# Patient Record
Sex: MALE | Race: WHITE | NOT HISPANIC OR LATINO | Employment: OTHER | URBAN - NONMETROPOLITAN AREA
[De-identification: names, ages, dates, MRNs, and addresses within clinical notes are randomized per-mention and may not be internally consistent; named-entity substitution may affect disease eponyms.]

---

## 2017-01-06 RX ORDER — LOSARTAN POTASSIUM 25 MG/1
25 TABLET ORAL DAILY
Qty: 30 TABLET | Refills: 3 | Status: SHIPPED | OUTPATIENT
Start: 2017-01-06 | End: 2017-01-10 | Stop reason: DRUGHIGH

## 2017-01-10 ENCOUNTER — OFFICE VISIT (OUTPATIENT)
Dept: FAMILY MEDICINE CLINIC | Facility: CLINIC | Age: 47
End: 2017-01-10

## 2017-01-10 VITALS
OXYGEN SATURATION: 98 % | BODY MASS INDEX: 35.28 KG/M2 | WEIGHT: 252 LBS | HEIGHT: 71 IN | HEART RATE: 94 BPM | SYSTOLIC BLOOD PRESSURE: 140 MMHG | DIASTOLIC BLOOD PRESSURE: 90 MMHG

## 2017-01-10 DIAGNOSIS — G47.09 OTHER INSOMNIA: ICD-10-CM

## 2017-01-10 DIAGNOSIS — F17.200 SMOKES TOBACCO DAILY: ICD-10-CM

## 2017-01-10 DIAGNOSIS — I10 ESSENTIAL HYPERTENSION: ICD-10-CM

## 2017-01-10 DIAGNOSIS — E11.65 TYPE 2 DIABETES MELLITUS WITH HYPERGLYCEMIA, WITHOUT LONG-TERM CURRENT USE OF INSULIN (HCC): Primary | ICD-10-CM

## 2017-01-10 PROBLEM — G47.00 INSOMNIA: Status: ACTIVE | Noted: 2017-01-10

## 2017-01-10 LAB — HBA1C MFR BLD CALC: 8.6 %TOTHGB (ref 4–5.6)

## 2017-01-10 PROCEDURE — 99214 OFFICE O/P EST MOD 30 MIN: CPT | Performed by: FAMILY MEDICINE

## 2017-01-10 RX ORDER — LOSARTAN POTASSIUM 50 MG/1
50 TABLET ORAL DAILY
Qty: 30 TABLET | Refills: 5 | Status: SHIPPED | OUTPATIENT
Start: 2017-01-10 | End: 2018-09-06 | Stop reason: ALTCHOICE

## 2017-01-10 RX ORDER — TRAZODONE HYDROCHLORIDE 50 MG/1
50 TABLET ORAL NIGHTLY
Qty: 30 TABLET | Refills: 5 | Status: SHIPPED | OUTPATIENT
Start: 2017-01-10 | End: 2019-03-01

## 2017-01-10 RX ORDER — ASPIRIN 81 MG/1
81 TABLET, CHEWABLE ORAL DAILY
COMMUNITY

## 2017-01-10 NOTE — MR AVS SNAPSHOT
Jak Valle   1/10/2017 9:45 AM   Office Visit    Dept Phone:  [phone]   Encounter #:  [id number]    Provider:  Luisa Walker MD   Department:  [facility] FAMILY MEDICINE                Your Full Care Plan              Today's Medication Changes          These changes are accurate as of: 1/10/17 10:18 AM.  If you have any questions, ask your nurse or doctor.               New Medication(s)Ordered:     traZODone 50 MG tablet   Commonly known as:  DESYREL   Take 1 tablet by mouth Every Night.         Medication(s)that have changed:     losartan 50 MG tablet   Commonly known as:  COZAAR   Take 1 tablet by mouth Daily.   What changed:    - medication strength  - how much to take       metFORMIN 1000 MG tablet   Commonly known as:  GLUCOPHAGE   Take 1 tablet by mouth 2 (Two) Times a Day With Meals.   What changed:    - medication strength  - how much to take  - when to take this  - additional instructions         Stop taking medication(s)listed here:     FLUoxetine 10 MG capsule   Commonly known as:  PROzac                Where to Get Your Medications      These medications were sent to [facility] [place] [address] - [address] [place] [address] [phone] [facility] [phone] [address] [address]     Phone:  [phone]     losartan 50 MG tablet    metFORMIN 1000 MG tablet    traZODone 50 MG tablet                  Your Updated Medication List          This list is accurate as of: 1/10/17 10:18 AM.  Always use your most recent med list.                aspirin 81 MG chewable tablet       atorvastatin 10 MG tablet   Commonly known as:  LIPITOR       losartan 50 MG tablet   Commonly known as:  COZAAR   Take 1 tablet by mouth Daily.       metFORMIN 1000 MG tablet   Commonly known as:  GLUCOPHAGE   Take 1 tablet by mouth 2 (Two) Times a Day With Meals.       traZODone 50 MG tablet   Commonly known as:  DESYREL   Take 1 tablet by  "mouth Every Night.               We Performed the Following     Hemoglobin A1c       You Were Diagnosed With        Codes Comments    Type 2 diabetes mellitus with hyperglycemia, without long-term current use of insulin    -  Primary ICD-10-CM: E11.65  ICD-9-CM: 250.00, 790.29       Instructions     None    Patient Instructions History      Upcoming Appointments     Visit Type Date Time Department    OFFICE VISIT 1/10/2017  9:45 AM [facility]  [facility]    OFFICE VISIT 8/25/2017  3:00 PM [facility] OPHTHALMOLOGY [facility] Signup     Our records indicate that you have an active [employer] account.    You can view your After Visit Summary by going to [employer] and logging in with your [employer] username and password.  If you don't have a [employer] username and password but a parent or guardian has access to your record, the parent or guardian should login with their own [employer] username and password and access your record to view the After Visit Summary.    If you have questions, you can email [email or web address] or call [phone] to talk to our [employer] staff.  Remember, [employer] is NOT to be used for urgent needs.  For medical emergencies, dial 911.               Other Info from Your Visit           Your Appointments     Aug 25, 2017  3:00 PM CDT   Office Visit with Roscoe Cuevas MD   [facility] (--)    [address] [place] [address] Dr  [facility] [address] [place] [address] [phone]           Arrive 15 minutes prior to appointment.              Other Notes About Your Plan     Risk Score 4        Allergies     No Known Allergies      Reason for Visit     Diabetes     Arm Pain left    Hypertension           Vital Signs     Blood Pressure Pulse Height Weight Oxygen Saturation Body Mass Index    140/90 (BP Location: Right arm, Patient Position: Sitting, Cuff Size: Adult) 94 71\" (180.3 cm) 252 lb (114 kg) 98% 35.15 kg/m2    Smoking Status             "       Current Every Day Smoker           Problems and Diagnoses Noted     Diabetes with complication

## 2017-01-10 NOTE — PROGRESS NOTES
"  Subjective:     Jak Valle is a 46 y.o. male who presents for follow up of Type 2 diabetes mellitus.      The patient was initially diagnosed with Type 2 diabetes mellitus based on the following criteria:  Labs and symptoms.  Patient has been taking metformin 500 twice a day.  He has not been taking his Prozac nor his Lipitor.    Known diabetic complications: cardiovascular disease  Cardiovascular risk factors: diabetes mellitus, dyslipidemia, hypertension, male gender, microalbuminuria, obesity (BMI >= 30 kg/m2), sedentary lifestyle and smoking/ tobacco exposure  Current diabetic medications include oral agent (monotherapy): metformin (generic).     Eye exam current (within one year): yes   Weight trend: stable  Prior visit with dietician: yes - at first diagnosis  Current diet: in general, an \"unhealthy\" diet, diabetic  Current exercise: none    Current monitoring regimen: home blood tests - daily and office lab tests - daily  Home blood sugar records: fasting range: 140s-150s  Any episodes of hypoglycemia? no     ACE inhibitor or angiotensin II receptor blocker?     Yes     lisinopril (generic)     Statin?     Yes    ASA?     Yes     HEMOGLOBIN A1C (%TotHgb)   Date Value   01/10/2017 8.6 (H)   10/11/2016 7.6 (H)   07/14/2016 9.0 (H)       ASSOCIATED SYMPTOMS:     FatigueYes   MalaiseNo    DiaphoresisNo    Weight lossNo    Weight gainNo    Visual impairmentNo   Chest painNo    PalpitationsNo    TachycardiaNo   ClaudicationNo    PolyphagiaNo    PolydipsiaNo    PolyuriaNo    NumbnessNo    Foot painNo    Skin lesionsNo    Memory lossNo     COMORBID CONDITIONS:     ObesityYes    HypothyroidismNo    HyperlipidemiaYes    CADNo    Cerebrovascular diseaseNo   PVDNo    HTNYes    Sexual dysfunctionNo    DepressionYes   He is struggling with insomnia.  He goes to bed about 10 PM and wakes up multiple times throughout the night.  He does wake up at 5 AM.  He does not feel well rested.  He denies snoring symptoms or " nocturia.  He thinks it is due to problems with his son.  He was thinking about taking over-the-counter medication but was wanting to check with me first.     The following portions of the patient's history were reviewed and updated as appropriate: allergies, current medications, past family history, past medical history, past social history, past surgical history and problem list.    Preventative:  Over the past 2 weeks, have you felt down, depressed, or hopeless?No   Over the past 2 weeks, have you felt little interest or pleasure in doing things?No  Clinical depression screening refused by patient.No     On osteoporosis therapy?Not Indicated     Past Medical Hx:  Past Medical History   Diagnosis Date   • Amblyopia    • Diabetes mellitus type 2 in obese    • Dyslipidemia    • Exotropia    • GERD (gastroesophageal reflux disease)    • Hypertensive disorder    • Major depressive disorder      RECURRENT, UNSPECIFIED   • Major depressive disorder, recurrent    • Microalbuminuria    • Myopia      right eye   • Obesity    • Type 2 diabetes mellitus with hyperglycemia        Past Surgical Hx:  Past Surgical History   Procedure Laterality Date   • Umbilical hernia repair  04/01/2014     Open umbilical hernia repair. Excision of 2.5 cm sebaceous cyst of back with 5 cm intermediate closure.       Health Maintenance:  Health Maintenance   Topic Date Due   • HEMOGLOBIN A1C  04/11/2017   • DIABETIC FOOT EXAM  07/14/2017   • URINE MICROALBUMIN  07/14/2017   • DIABETIC EYE EXAM  08/19/2017   • TDAP/TD VACCINES (2 - Td) 10/11/2026   • PNEUMOCOCCAL VACCINE (19-64 MEDIUM RISK)  Addressed   • INFLUENZA VACCINE  Addressed       Current Meds:    Current Outpatient Prescriptions:   •  aspirin 81 MG chewable tablet, Chew 81 mg Daily., Disp: , Rfl:   •  atorvastatin (LIPITOR) 10 MG tablet, Take 10 mg by mouth daily., Disp: , Rfl:   •  losartan (COZAAR) 50 MG tablet, Take 1 tablet by mouth Daily., Disp: 30 tablet, Rfl: 5  •  metFORMIN  "(GLUCOPHAGE) 1000 MG tablet, Take 1 tablet by mouth 2 (Two) Times a Day With Meals., Disp: 60 tablet, Rfl: 5  •  traZODone (DESYREL) 50 MG tablet, Take 1 tablet by mouth Every Night., Disp: 30 tablet, Rfl: 5    Allergies:  Review of patient's allergies indicates no known allergies.    Family Hx:  Family History   Problem Relation Age of Onset   • Adopted: Yes   • Family history unknown: Yes        Social History:  Social History     Social History   • Marital status:      Spouse name: N/A   • Number of children: N/A   • Years of education: N/A     Occupational History   • Not on file.     Social History Main Topics   • Smoking status: Current Every Day Smoker   • Smokeless tobacco: Never Used      Comment: 1 pk a day x more than 10 yrs   • Alcohol use No   • Drug use: Not on file   • Sexual activity: Not on file     Other Topics Concern   • Not on file     Social History Narrative       Review of Systems  Review of Systems   Constitutional: Positive for fatigue. Negative for activity change, appetite change and fever.   HENT: Negative for ear pain and sore throat.    Eyes: Negative for pain and visual disturbance.   Respiratory: Negative for cough and shortness of breath.    Cardiovascular: Negative for chest pain and palpitations.   Gastrointestinal: Negative for abdominal pain and nausea.   Endocrine: Negative for cold intolerance and heat intolerance.   Genitourinary: Negative for difficulty urinating and dysuria.   Musculoskeletal: Negative for arthralgias and gait problem.   Skin: Negative for color change and rash.   Neurological: Negative for dizziness, weakness and headaches.   Hematological: Negative for adenopathy. Does not bruise/bleed easily.   Psychiatric/Behavioral: Positive for sleep disturbance. Negative for agitation and confusion.       Objective:     Visit Vitals   • /90 (BP Location: Right arm, Patient Position: Sitting, Cuff Size: Adult)   • Pulse 94   • Ht 71\" (180.3 cm)   • Wt " 252 lb (114 kg)   • SpO2 98%   • BMI 35.15 kg/m2       General:  alert, appears stated age, cooperative and no distress   Oropharynx: lips, mucosa, and tongue normal; teeth and gums normal    Eyes:  conjunctivae/corneas clear. PERRL, EOM's intact.    Ears:  normal TM's and external ear canals both ears   Neck: supple, symmetrical, trachea midline   Thyroid:  deferred   Lung: clear to auscultation bilaterally   Heart:  regular rate and rhythm, S1, S2 normal, no murmur, click, rub or gallop   Abdomen: soft, non-tender; bowel sounds normal; no masses,  no organomegaly   Extremities: extremities normal, atraumatic, no cyanosis or edema, left plantar surface below 5th digit plantar wart   Skin: warm and dry, no hyperpigmentation, vitiligo, or suspicious lesions right thumb two scabbed healing lesions    Pulses: 2+ and symmetric   Neuro: normal without focal findings, mental status, speech normal, alert and oriented x3     Diabetic foot exam:   Left: Filament test present   Pulses Dorsalis Pedis:  present      Vibratory sensation normal   Proprioception normal   Sharp/dull discrimination normal       Right: Filament test present   Pulses Dorsalis Pedis:  present   Vibratory sensation normal   Proprioception normal   Sharp/dull discrimination normal    Lab Review  GLUCOSE (mg/dl)   Date Value   10/11/2016 256 (H)   07/14/2016 264 (H)   09/22/2015 123 (H)     SODIUM (mmol/L)   Date Value   10/11/2016 141   07/14/2016 141   09/22/2015 139     POTASSIUM (mmol/L)   Date Value   10/11/2016 3.8   07/14/2016 4.2   09/22/2015 4.6     CHLORIDE (mmol/L)   Date Value   10/11/2016 103   07/14/2016 100   09/22/2015 100     CO2 (mmol/L)   Date Value   10/11/2016 27   07/14/2016 27   09/22/2015 29     BUN (mg/dl)   Date Value   10/11/2016 10   07/14/2016 14   09/22/2015 16     CREATININE (mg/dl)   Date Value   10/11/2016 0.8   07/14/2016 0.9   09/22/2015 0.9     HEMOGLOBIN A1C (%TotHgb)   Date Value   01/10/2017 8.6 (H)   10/11/2016 7.6  (H)   07/14/2016 9.0 (H)     TRIGLYCERIDES (mg/dl)   Date Value   10/11/2016 230 (H)   07/14/2016 330 (H)   09/22/2015 339 (H)            Assessment:     Diabetes Mellitus type II, under poor control.  1. Type 2 diabetes mellitus with hyperglycemia, without long-term current use of insulin    2. Other insomnia    3. Essential hypertension    4. Body mass index 35.0-35.9, adult    5. Smokes tobacco daily         Plan:     1.  Rx changes: Stop prozac. Increase metformin to 1000mg BID.  Strongly encouraged to take the lipitor.  Add trazodone for sleep.  Advised to avoid OTC sleep aids.   2.  Education: Reviewed ‘ABCs’ of diabetes management (respective goals in parentheses):  A1C (<7), preprandial glucose (70 mg/dl - 130 mg/dl), postprandial glucose (< 180 mg/dl), blood pressure (<130/80), and cholesterol (LDL <100 mg/dl; HDL > 50 mg/dl; Trig < 150 mg/dl).  3.  Issues reviewed with Jak Valle: low cholesterol diet, weight control and daily exercise discussed, all medications, side effects and compliance discussed carefully, foot care discussed and Podiatry visits discussed, annual eye examinations at Ophthalmology discussed, long term diabetic complications discussed and patient urged in the strongest terms to quit smoking.  4.  Compliance at present is estimated to be fair. Efforts to improve compliance (if necessary) will be directed at increased exercise and medication compliance.  5. Follow up: 3 months   6. Labwork before leaving today.      GOALS:  Weight loss 10 pounds  BARRIERS TO GOALS:  None    Preventative:  Recommended:Influenza and Pneumovax declined  Smoking cessation counseling was provided.  does not drink  eat more fruits and vegetables, decrease soda or juice intake, increase physical activity, reduce screen time, reduce portion size, cut out extra servings, plan meals, eat breakfast and have 3 meals a day    RISK SCORE: 4         This document has been electronically signed by Luisa Plata  MD [employer] on January 10, 2017 2:06 PM

## 2017-01-17 ENCOUNTER — OFFICE VISIT (OUTPATIENT)
Dept: FAMILY MEDICINE CLINIC | Facility: CLINIC | Age: 47
End: 2017-01-17

## 2017-01-17 VITALS
WEIGHT: 252 LBS | HEART RATE: 103 BPM | BODY MASS INDEX: 34.13 KG/M2 | SYSTOLIC BLOOD PRESSURE: 130 MMHG | DIASTOLIC BLOOD PRESSURE: 80 MMHG | OXYGEN SATURATION: 98 % | HEIGHT: 72 IN

## 2017-01-17 DIAGNOSIS — E11.65 TYPE 2 DIABETES MELLITUS WITH HYPERGLYCEMIA, WITHOUT LONG-TERM CURRENT USE OF INSULIN (HCC): Primary | ICD-10-CM

## 2017-01-17 DIAGNOSIS — F17.200 SMOKES TOBACCO DAILY: ICD-10-CM

## 2017-01-17 PROCEDURE — 99213 OFFICE O/P EST LOW 20 MIN: CPT | Performed by: FAMILY MEDICINE

## 2017-01-17 NOTE — PROGRESS NOTES
Subjective   Jak Valle is a 46 y.o. male. Pt reports earlier he was having a lack of energy and was getting some confused. He states that it felt like it use to feel when his blood pressure would go way up. He went home and checked his blood sugar and noticed it was 284 at about 1340 and 265 at about 1355 which was about 3-4 hours after he started feeling a little off. Pt reports that he normally takes metformin and was noted to have an increase in his a1c at recent labs from 1/10/17 with A1C of 8.6.    History of Present Illness     The following portions of the patient's history were reviewed and updated as appropriate: allergies, current medications, past family history, past medical history, past social history, past surgical history and problem list.    Review of Systems   Constitutional: Negative.  Negative for activity change, appetite change, fatigue and fever.   HENT: Negative for congestion, ear discharge, ear pain, mouth sores, rhinorrhea and sore throat.    Eyes: Negative.  Negative for pain, itching and visual disturbance.   Respiratory: Negative.  Negative for cough, shortness of breath and wheezing.    Cardiovascular: Negative.  Negative for chest pain and palpitations.   Gastrointestinal: Negative for abdominal pain, constipation, diarrhea and nausea.   Endocrine: Negative.  Negative for cold intolerance and heat intolerance.   Genitourinary: Negative for decreased urine volume, difficulty urinating, dysuria, frequency and hematuria.   Musculoskeletal: Negative for arthralgias, joint swelling and neck pain.   Skin: Negative for rash and wound.   Allergic/Immunologic: Negative.  Negative for environmental allergies and food allergies.   Neurological: Negative for dizziness, seizures, syncope, weakness, light-headedness, numbness and headaches.   Hematological: Negative for adenopathy. Does not bruise/bleed easily.   Psychiatric/Behavioral: Positive for confusion and decreased concentration.  Negative for agitation and behavioral problems. The patient is not nervous/anxious.        Objective   Physical Exam   Constitutional: He is oriented to person, place, and time. He appears well-developed and well-nourished. No distress.   HENT:   Head: Normocephalic and atraumatic.   Right Ear: External ear normal.   Left Ear: External ear normal.   Nose: Nose normal.   Mouth/Throat: Oropharynx is clear and moist.   Eyes: Conjunctivae and EOM are normal. Pupils are equal, round, and reactive to light. No scleral icterus.   Neck: Normal range of motion. Neck supple. No tracheal deviation present. No thyromegaly present.   Cardiovascular: Normal rate, regular rhythm, normal heart sounds and intact distal pulses.  Exam reveals no gallop and no friction rub.    No murmur heard.  Pulmonary/Chest: Effort normal and breath sounds normal. No respiratory distress. He has no wheezes. He has no rales. He exhibits no tenderness.   Abdominal: Soft. Bowel sounds are normal. He exhibits no distension. There is no tenderness.   Musculoskeletal: Normal range of motion. He exhibits no edema or tenderness.   Lymphadenopathy:     He has no cervical adenopathy.   Neurological: He is alert and oriented to person, place, and time. No cranial nerve deficit.   Skin: Skin is warm and dry. No rash noted. No erythema.   Psychiatric: He has a normal mood and affect. His behavior is normal.   Nursing note and vitals reviewed.      Assessment/Plan   Jak was seen today for hypertension, diabetes and dizziness.    Diagnoses and all orders for this visit:    Type 2 diabetes mellitus with hyperglycemia, without long-term current use of insulin  -     SITagliptin (JANUVIA) 100 MG tablet; Take 1 tablet by mouth Daily.  Discussed addition of the medication due to elevated A1c at last lab performed on 1/10/2017.  Discussed the importance of continuing with metformin and addition to the Januvia.  Recommending continuing monitoring of his blood sugar as  previously discussed with by his PCP Dr. Walker.  Discussed need for follow-up if symptoms occur again.  Smokes tobacco daily  Cessation counseling provided.  Patient is currently pre-contemplative.  Continue monitoring by PCP at future visits.    Risk score 3          This document has been electronically signed by Maxime Limon MD on January 17, 2017 4:01 PM

## 2017-01-17 NOTE — MR AVS SNAPSHOT
Jak Valle   1/17/2017 2:30 PM   Office Visit    Dept Phone:  [phone]   Encounter #:  [id number]    Provider:  Maxime Limon MD   Department:  [facility] FAMILY MEDICINE                Your Full Care Plan              Today's Medication Changes          These changes are accurate as of: 1/17/17  4:51 PM.  If you have any questions, ask your nurse or doctor.               New Medication(s)Ordered:     SITagliptin 100 MG tablet   Commonly known as:  JANUVIA   Take 1 tablet by mouth Daily.   Started by:  Maxime Limon MD            Where to Get Your Medications      These medications were sent to [address] - [address] [place] [address] - [phone]  - [phone] [address] [address]     Phone:  [phone]     SITagliptin 100 MG tablet                  Your Updated Medication List          This list is accurate as of: 1/17/17  4:51 PM.  Always use your most recent med list.                aspirin 81 MG chewable tablet       atorvastatin 10 MG tablet   Commonly known as:  LIPITOR       losartan 50 MG tablet   Commonly known as:  COZAAR   Take 1 tablet by mouth Daily.       metFORMIN 1000 MG tablet   Commonly known as:  GLUCOPHAGE   Take 1 tablet by mouth 2 (Two) Times a Day With Meals.       SITagliptin 100 MG tablet   Commonly known as:  JANUVIA   Take 1 tablet by mouth Daily.       traZODone 50 MG tablet   Commonly known as:  DESYREL   Take 1 tablet by mouth Every Night.               You Were Diagnosed With        Codes Comments    Type 2 diabetes mellitus with hyperglycemia, without long-term current use of insulin    -  Primary ICD-10-CM: E11.65  ICD-9-CM: 250.00, 790.29     Smokes tobacco daily     ICD-10-CM: Z72.0  ICD-9-CM: 305.1       Instructions     None    Patient Instructions History      Upcoming Appointments     Visit Type Date Time Department    OFFICE VISIT 1/17/2017  2:30 PM MG[facility]  "RESIDENT [facility]    OFFICE VISIT 8/25/2017  3:00 PM [facility] OPHTHALMOLOGY [facility]      MyChart Signup     Our records indicate that you have an active [facility] [employer] account.    You can view your After Visit Summary by going to [employer] and logging in with your [employer] username and password.  If you don't have a [employer] username and password but a parent or guardian has access to your record, the parent or guardian should login with their own [employer] username and password and access your record to view the After Visit Summary.    If you have questions, you can email [email or web address] or call [phone] to talk to our [employer] staff.  Remember, [employer] is NOT to be used for urgent needs.  For medical emergencies, dial 911.               Other Info from Your Visit           Your Appointments     Aug 25, 2017  3:00 PM CDT   Office Visit with Roscoe Cuevas MD   [facility] (--)    [address] [place] [address] Dr  [facility] [address] [place] [address] [phone]           Arrive 15 minutes prior to appointment.              Other Notes About Your Plan     Risk Score 4        Allergies     No Known Allergies      Reason for Visit     Hypertension     Diabetes     Dizziness           Vital Signs     Blood Pressure Pulse Height Weight Oxygen Saturation Body Mass Index    130/80 103 72\" (182.9 cm) 252 lb (114 kg) 98% 34.18 kg/m2    Smoking Status                   Current Every Day Smoker           Problems and Diagnoses Noted     Smokes tobacco daily    Diabetes with complication        "

## 2017-09-07 RX ORDER — ATORVASTATIN CALCIUM 40 MG/1
TABLET, FILM COATED ORAL
Qty: 30 TABLET | Refills: 3 | Status: SHIPPED | OUTPATIENT
Start: 2017-09-07 | End: 2018-04-20 | Stop reason: SDUPTHER

## 2018-04-18 ENCOUNTER — APPOINTMENT (OUTPATIENT)
Dept: LAB | Facility: HOSPITAL | Age: 48
End: 2018-04-18

## 2018-04-18 ENCOUNTER — OFFICE VISIT (OUTPATIENT)
Dept: FAMILY MEDICINE CLINIC | Facility: CLINIC | Age: 48
End: 2018-04-18

## 2018-04-18 VITALS
OXYGEN SATURATION: 99 % | HEIGHT: 72 IN | RESPIRATION RATE: 20 BRPM | WEIGHT: 242.5 LBS | DIASTOLIC BLOOD PRESSURE: 92 MMHG | BODY MASS INDEX: 32.85 KG/M2 | SYSTOLIC BLOOD PRESSURE: 140 MMHG | HEART RATE: 107 BPM | TEMPERATURE: 97.9 F

## 2018-04-18 DIAGNOSIS — E11.65 TYPE 2 DIABETES MELLITUS WITH HYPERGLYCEMIA, WITHOUT LONG-TERM CURRENT USE OF INSULIN (HCC): Primary | ICD-10-CM

## 2018-04-18 DIAGNOSIS — G47.00 DIFFICULTY FALLING ASLEEP AT NIGHT UNTIL EARLY MORNING HOURS: ICD-10-CM

## 2018-04-18 DIAGNOSIS — R53.82 CHRONIC FATIGUE: ICD-10-CM

## 2018-04-18 DIAGNOSIS — E78.5 DYSLIPIDEMIA: ICD-10-CM

## 2018-04-18 DIAGNOSIS — Z76.89 ENCOUNTER TO ESTABLISH CARE: ICD-10-CM

## 2018-04-18 LAB
ALBUMIN SERPL-MCNC: 4.7 G/DL (ref 3.4–4.8)
ALBUMIN/GLOB SERPL: 1.3 G/DL (ref 1.1–1.8)
ALP SERPL-CCNC: 95 U/L (ref 38–126)
ALT SERPL W P-5'-P-CCNC: 82 U/L (ref 21–72)
ANION GAP SERPL CALCULATED.3IONS-SCNC: 15 MMOL/L (ref 5–15)
ARTICHOKE IGE QN: 169 MG/DL (ref 1–129)
AST SERPL-CCNC: 49 U/L (ref 17–59)
BASOPHILS # BLD AUTO: 0.07 10*3/MM3 (ref 0–0.2)
BASOPHILS NFR BLD AUTO: 0.5 % (ref 0–2)
BILIRUB SERPL-MCNC: 0.6 MG/DL (ref 0.2–1.3)
BUN BLD-MCNC: 14 MG/DL (ref 7–21)
BUN/CREAT SERPL: 16.3 (ref 7–25)
CALCIUM SPEC-SCNC: 9.4 MG/DL (ref 8.4–10.2)
CHLORIDE SERPL-SCNC: 99 MMOL/L (ref 95–110)
CHOLEST SERPL-MCNC: 247 MG/DL (ref 0–199)
CO2 SERPL-SCNC: 25 MMOL/L (ref 22–31)
CREAT BLD-MCNC: 0.86 MG/DL (ref 0.7–1.3)
DEPRECATED RDW RBC AUTO: 38.1 FL (ref 35.1–43.9)
EOSINOPHIL # BLD AUTO: 0.57 10*3/MM3 (ref 0–0.7)
EOSINOPHIL NFR BLD AUTO: 4.4 % (ref 0–7)
ERYTHROCYTE [DISTWIDTH] IN BLOOD BY AUTOMATED COUNT: 12.8 % (ref 11.5–14.5)
GFR SERPL CREATININE-BSD FRML MDRD: 95 ML/MIN/1.73 (ref 63–147)
GLOBULIN UR ELPH-MCNC: 3.7 GM/DL (ref 2.3–3.5)
GLUCOSE BLD-MCNC: 372 MG/DL (ref 60–100)
HBA1C MFR BLD: 11.2 % (ref 4–5.6)
HCT VFR BLD AUTO: 49 % (ref 39–49)
HDLC SERPL-MCNC: 31 MG/DL (ref 60–200)
HGB BLD-MCNC: 17.7 G/DL (ref 13.7–17.3)
IMM GRANULOCYTES # BLD: 0.03 10*3/MM3 (ref 0–0.02)
IMM GRANULOCYTES NFR BLD: 0.2 % (ref 0–0.5)
LDLC/HDLC SERPL: 5.1 {RATIO} (ref 0–3.55)
LYMPHOCYTES # BLD AUTO: 3.78 10*3/MM3 (ref 0.6–4.2)
LYMPHOCYTES NFR BLD AUTO: 29.5 % (ref 10–50)
MCH RBC QN AUTO: 30.4 PG (ref 26.5–34)
MCHC RBC AUTO-ENTMCNC: 36.1 G/DL (ref 31.5–36.3)
MCV RBC AUTO: 84.2 FL (ref 80–98)
MONOCYTES # BLD AUTO: 0.93 10*3/MM3 (ref 0–0.9)
MONOCYTES NFR BLD AUTO: 7.2 % (ref 0–12)
NEUTROPHILS # BLD AUTO: 7.45 10*3/MM3 (ref 2–8.6)
NEUTROPHILS NFR BLD AUTO: 58.2 % (ref 37–80)
PLATELET # BLD AUTO: 271 10*3/MM3 (ref 150–450)
PMV BLD AUTO: 11.7 FL (ref 8–12)
POTASSIUM BLD-SCNC: 4.7 MMOL/L (ref 3.5–5.1)
PROT SERPL-MCNC: 8.4 G/DL (ref 6.3–8.6)
RBC # BLD AUTO: 5.82 10*6/MM3 (ref 4.37–5.74)
SODIUM BLD-SCNC: 139 MMOL/L (ref 137–145)
TRIGL SERPL-MCNC: 290 MG/DL (ref 20–199)
TSH SERPL DL<=0.05 MIU/L-ACNC: 1.13 MIU/ML (ref 0.46–4.68)
WBC NRBC COR # BLD: 12.83 10*3/MM3 (ref 3.2–9.8)

## 2018-04-18 PROCEDURE — 99214 OFFICE O/P EST MOD 30 MIN: CPT | Performed by: NURSE PRACTITIONER

## 2018-04-18 PROCEDURE — 80061 LIPID PANEL: CPT | Performed by: NURSE PRACTITIONER

## 2018-04-18 PROCEDURE — 83036 HEMOGLOBIN GLYCOSYLATED A1C: CPT | Performed by: NURSE PRACTITIONER

## 2018-04-18 PROCEDURE — 80050 GENERAL HEALTH PANEL: CPT | Performed by: NURSE PRACTITIONER

## 2018-04-18 RX ORDER — HYDROXYZINE PAMOATE 50 MG/1
50 CAPSULE ORAL NIGHTLY PRN
Qty: 30 CAPSULE | Refills: 3 | OUTPATIENT
Start: 2018-04-18 | End: 2019-10-04

## 2018-04-20 ENCOUNTER — TELEPHONE (OUTPATIENT)
Dept: FAMILY MEDICINE CLINIC | Facility: CLINIC | Age: 48
End: 2018-04-20

## 2018-04-20 DIAGNOSIS — E78.2 ELEVATED CHOLESTEROL WITH ELEVATED TRIGLYCERIDES: Primary | ICD-10-CM

## 2018-04-20 DIAGNOSIS — E11.65 TYPE 2 DIABETES MELLITUS WITH HYPERGLYCEMIA, WITHOUT LONG-TERM CURRENT USE OF INSULIN (HCC): ICD-10-CM

## 2018-04-20 RX ORDER — ATORVASTATIN CALCIUM 40 MG/1
40 TABLET, FILM COATED ORAL
Qty: 30 TABLET | Refills: 3 | Status: SHIPPED | OUTPATIENT
Start: 2018-04-20 | End: 2019-01-14 | Stop reason: SDUPTHER

## 2018-04-20 NOTE — TELEPHONE ENCOUNTER
Per TWIN Coronel, Jak Valle was called and given recent lab results.  Informed patient that reill on Metformin, Januvia and Lipitor was sent to [facility] and to take as directed. PCP will repeat labs in (3) months.  Patient verbalized understanding. Continue current meds and follow up as planned or sooner if any problems.

## 2018-09-03 ENCOUNTER — APPOINTMENT (OUTPATIENT)
Dept: GENERAL RADIOLOGY | Facility: HOSPITAL | Age: 48
End: 2018-09-03

## 2018-09-03 ENCOUNTER — HOSPITAL ENCOUNTER (EMERGENCY)
Facility: HOSPITAL | Age: 48
Discharge: HOME | End: 2018-09-03
Attending: FAMILY MEDICINE | Admitting: FAMILY MEDICINE

## 2018-09-03 VITALS
DIASTOLIC BLOOD PRESSURE: 79 MMHG | SYSTOLIC BLOOD PRESSURE: 139 MMHG | WEIGHT: 232.19 LBS | HEART RATE: 94 BPM | RESPIRATION RATE: 20 BRPM | HEIGHT: 71 IN | BODY MASS INDEX: 32.51 KG/M2 | OXYGEN SATURATION: 98 % | TEMPERATURE: 98 F

## 2018-09-03 DIAGNOSIS — E83.42 HYPOMAGNESEMIA: Primary | ICD-10-CM

## 2018-09-03 LAB
ALBUMIN SERPL-MCNC: 4.3 G/DL (ref 3.4–4.8)
ALBUMIN/GLOB SERPL: 1.2 G/DL (ref 1.1–1.8)
ALP SERPL-CCNC: 71 U/L (ref 38–126)
ALT SERPL W P-5'-P-CCNC: 39 U/L (ref 21–72)
ANION GAP SERPL CALCULATED.3IONS-SCNC: 7 MMOL/L (ref 5–15)
AST SERPL-CCNC: 24 U/L (ref 17–59)
BASOPHILS # BLD AUTO: 0.08 10*3/MM3 (ref 0–0.2)
BASOPHILS NFR BLD AUTO: 0.6 % (ref 0–2)
BILIRUB SERPL-MCNC: 0.4 MG/DL (ref 0.2–1.3)
BILIRUB UR QL STRIP: NEGATIVE
BUN BLD-MCNC: 18 MG/DL (ref 7–21)
BUN/CREAT SERPL: 19.6 (ref 7–25)
CALCIUM SPEC-SCNC: 9.3 MG/DL (ref 8.4–10.2)
CHLORIDE SERPL-SCNC: 107 MMOL/L (ref 95–110)
CK MB SERPL-CCNC: 0.79 NG/ML (ref 0–5)
CK SERPL-CCNC: 54 U/L (ref 55–170)
CLARITY UR: CLEAR
CO2 SERPL-SCNC: 26 MMOL/L (ref 22–31)
COLOR UR: YELLOW
CREAT BLD-MCNC: 0.92 MG/DL (ref 0.7–1.3)
DEPRECATED RDW RBC AUTO: 37.6 FL (ref 35.1–43.9)
EOSINOPHIL # BLD AUTO: 0.7 10*3/MM3 (ref 0–0.7)
EOSINOPHIL NFR BLD AUTO: 5.5 % (ref 0–7)
ERYTHROCYTE [DISTWIDTH] IN BLOOD BY AUTOMATED COUNT: 12.4 % (ref 11.5–14.5)
GFR SERPL CREATININE-BSD FRML MDRD: 87 ML/MIN/1.73 (ref 56–130)
GLOBULIN UR ELPH-MCNC: 3.5 GM/DL (ref 2.3–3.5)
GLUCOSE BLD-MCNC: 124 MG/DL (ref 60–100)
GLUCOSE UR STRIP-MCNC: NEGATIVE MG/DL
HCT VFR BLD AUTO: 43.2 % (ref 39–49)
HGB BLD-MCNC: 15.6 G/DL (ref 13.7–17.3)
HGB UR QL STRIP.AUTO: NEGATIVE
IMM GRANULOCYTES # BLD: 0.05 10*3/MM3 (ref 0–0.02)
IMM GRANULOCYTES NFR BLD: 0.4 % (ref 0–0.5)
KETONES UR QL STRIP: NEGATIVE
LEUKOCYTE ESTERASE UR QL STRIP.AUTO: NEGATIVE
LYMPHOCYTES # BLD AUTO: 4.13 10*3/MM3 (ref 0.6–4.2)
LYMPHOCYTES NFR BLD AUTO: 32.2 % (ref 10–50)
MAGNESIUM SERPL-MCNC: 1.4 MG/DL (ref 1.6–2.3)
MCH RBC QN AUTO: 30.1 PG (ref 26.5–34)
MCHC RBC AUTO-ENTMCNC: 36.1 G/DL (ref 31.5–36.3)
MCV RBC AUTO: 83.2 FL (ref 80–98)
MONOCYTES # BLD AUTO: 0.9 10*3/MM3 (ref 0–0.9)
MONOCYTES NFR BLD AUTO: 7 % (ref 0–12)
NEUTROPHILS # BLD AUTO: 6.96 10*3/MM3 (ref 2–8.6)
NEUTROPHILS NFR BLD AUTO: 54.3 % (ref 37–80)
NITRITE UR QL STRIP: NEGATIVE
PH UR STRIP.AUTO: 5.5 [PH] (ref 5–9)
PLATELET # BLD AUTO: 243 10*3/MM3 (ref 150–450)
PMV BLD AUTO: 10.8 FL (ref 8–12)
POTASSIUM BLD-SCNC: 4.3 MMOL/L (ref 3.5–5.1)
PROT SERPL-MCNC: 7.8 G/DL (ref 6.3–8.6)
PROT UR QL STRIP: NEGATIVE
RBC # BLD AUTO: 5.19 10*6/MM3 (ref 4.37–5.74)
SODIUM BLD-SCNC: 140 MMOL/L (ref 137–145)
SP GR UR STRIP: 1.02 (ref 1–1.03)
TROPONIN I SERPL-MCNC: <0.012 NG/ML
TSH SERPL DL<=0.05 MIU/L-ACNC: 1.66 MIU/ML (ref 0.46–4.68)
UROBILINOGEN UR QL STRIP: NORMAL
WBC NRBC COR # BLD: 12.82 10*3/MM3 (ref 3.2–9.8)
WHOLE BLOOD HOLD SPECIMEN: NORMAL

## 2018-09-03 PROCEDURE — 82550 ASSAY OF CK (CPK): CPT | Performed by: FAMILY MEDICINE

## 2018-09-03 PROCEDURE — 84484 ASSAY OF TROPONIN QUANT: CPT | Performed by: FAMILY MEDICINE

## 2018-09-03 PROCEDURE — 81003 URINALYSIS AUTO W/O SCOPE: CPT | Performed by: FAMILY MEDICINE

## 2018-09-03 PROCEDURE — 99283 EMERGENCY DEPT VISIT LOW MDM: CPT

## 2018-09-03 PROCEDURE — 96361 HYDRATE IV INFUSION ADD-ON: CPT

## 2018-09-03 PROCEDURE — 96365 THER/PROPH/DIAG IV INF INIT: CPT

## 2018-09-03 PROCEDURE — 25010000002 MAGNESIUM SULFATE 2 GM/50ML SOLUTION: Performed by: FAMILY MEDICINE

## 2018-09-03 PROCEDURE — 82553 CREATINE MB FRACTION: CPT | Performed by: FAMILY MEDICINE

## 2018-09-03 PROCEDURE — 71046 X-RAY EXAM CHEST 2 VIEWS: CPT

## 2018-09-03 PROCEDURE — 83735 ASSAY OF MAGNESIUM: CPT | Performed by: FAMILY MEDICINE

## 2018-09-03 PROCEDURE — 80050 GENERAL HEALTH PANEL: CPT | Performed by: FAMILY MEDICINE

## 2018-09-03 PROCEDURE — 93010 ELECTROCARDIOGRAM REPORT: CPT | Performed by: INTERNAL MEDICINE

## 2018-09-03 PROCEDURE — 93005 ELECTROCARDIOGRAM TRACING: CPT | Performed by: FAMILY MEDICINE

## 2018-09-03 RX ORDER — MAGNESIUM SULFATE HEPTAHYDRATE 40 MG/ML
2 INJECTION, SOLUTION INTRAVENOUS ONCE
Status: COMPLETED | OUTPATIENT
Start: 2018-09-03 | End: 2018-09-03

## 2018-09-03 RX ORDER — ONDANSETRON 4 MG/1
4 TABLET, FILM COATED ORAL EVERY 8 HOURS PRN
Qty: 10 TABLET | Refills: 0 | Status: SHIPPED | OUTPATIENT
Start: 2018-09-03 | End: 2019-03-01

## 2018-09-03 RX ADMIN — SODIUM CHLORIDE 1000 ML: 900 INJECTION, SOLUTION INTRAVENOUS at 20:06

## 2018-09-03 RX ADMIN — MAGNESIUM SULFATE HEPTAHYDRATE 2 G: 40 INJECTION, SOLUTION INTRAVENOUS at 22:01

## 2018-09-04 NOTE — ED PROVIDER NOTES
Subjective   Dizziness and lightheadedness that he has noticed more in the last month. Pt states that when he gets dizzy, he notices that his heart has been beating faster (95).         Dizziness   Quality:  Imbalance  Severity:  Mild  Onset quality:  Gradual  Duration:  1 month  Timing:  Intermittent  Progression:  Waxing and waning  Chronicity:  Chronic  Relieved by:  Nothing  Worsened by:  Sitting upright and standing up  Ineffective treatments:  None tried  Associated symptoms: nausea, shortness of breath (occasional) and vision changes (blurry vision)    Associated symptoms: no chest pain, no diarrhea, no headaches, no hearing loss, no palpitations, no tinnitus, no vomiting and no weakness    Risk factors: no new medications        Review of Systems   Constitutional: Negative for appetite change, chills, diaphoresis, fatigue and fever.   HENT: Negative for congestion, ear discharge, ear pain, hearing loss, nosebleeds, rhinorrhea, sinus pressure, sore throat, tinnitus and trouble swallowing.    Eyes: Negative for discharge and redness.   Respiratory: Positive for shortness of breath (occasional). Negative for apnea, cough, chest tightness and wheezing.    Cardiovascular: Negative for chest pain and palpitations.   Gastrointestinal: Positive for nausea. Negative for abdominal pain, diarrhea and vomiting.   Endocrine: Negative for polyuria.   Genitourinary: Negative for dysuria, frequency and urgency.   Musculoskeletal: Negative for myalgias and neck pain.   Skin: Negative for color change and rash.   Allergic/Immunologic: Negative for immunocompromised state.   Neurological: Positive for dizziness. Negative for seizures, syncope, weakness, light-headedness and headaches.   Hematological: Negative for adenopathy. Does not bruise/bleed easily.   Psychiatric/Behavioral: Negative for behavioral problems and confusion.   All other systems reviewed and are negative.      Past Medical History:   Diagnosis Date   •  Adopted    • Amblyopia    • Diabetes mellitus type 2 in obese (CMS/HCC)    • Dyslipidemia    • Exotropia    • GERD (gastroesophageal reflux disease)    • Hypertensive disorder    • Major depressive disorder     RECURRENT, UNSPECIFIED   • Major depressive disorder, recurrent (CMS/HCC)    • Microalbuminuria    • Myopia     right eye   • Obesity    • Type 2 diabetes mellitus with hyperglycemia (CMS/HCC)        No Known Allergies    Past Surgical History:   Procedure Laterality Date   • ADENOIDECTOMY     • EAR TUBES     • TONSILLECTOMY     • UMBILICAL HERNIA REPAIR  04/01/2014    Open umbilical hernia repair. Excision of 2.5 cm sebaceous cyst of back with 5 cm intermediate closure.       Family History   Problem Relation Age of Onset   • Adopted: Yes   • No Known Problems Mother        Social History     Social History   • Marital status:      Social History Main Topics   • Smoking status: Current Every Day Smoker     Types: Cigarettes   • Smokeless tobacco: Never Used      Comment: 1 pk a day x more than 10 yrs   • Alcohol use No   • Drug use: No   • Sexual activity: Defer     Other Topics Concern   • Not on file           Objective   Physical Exam   Constitutional: He is oriented to person, place, and time. He appears well-developed and well-nourished.   HENT:   Head: Normocephalic and atraumatic.   Nose: Nose normal.   Mouth/Throat: Oropharynx is clear and moist.   Eyes: Pupils are equal, round, and reactive to light. Conjunctivae and EOM are normal. Right eye exhibits no discharge. Left eye exhibits no discharge. No scleral icterus.   Neck: Normal range of motion. Neck supple. No tracheal deviation present.   Cardiovascular: Normal rate, regular rhythm and normal heart sounds.    No murmur heard.  Pulmonary/Chest: Effort normal and breath sounds normal. No stridor. No respiratory distress. He has no wheezes. He has no rales.   Abdominal: Soft. Bowel sounds are normal. He exhibits no distension and no mass.  There is no tenderness. There is no rebound and no guarding.   Musculoskeletal: He exhibits no edema.   Neurological: He is alert and oriented to person, place, and time. Coordination normal.   Skin: Skin is warm and dry. No rash noted. No erythema.   Psychiatric: He has a normal mood and affect. His behavior is normal. Thought content normal.   Nursing note and vitals reviewed.      ECG 12 Lead    Date/Time: 9/3/2018 11:18 PM  Performed by: DERIC RAY  Authorized by: DERIC RAY   Interpreted by physician  Rhythm: sinus rhythm  Rate: normal  BPM: 64  ST Segments: ST segments normal                   ED Course            Labs Reviewed   COMPREHENSIVE METABOLIC PANEL - Abnormal; Notable for the following:        Result Value    Glucose 124 (*)     All other components within normal limits   MAGNESIUM - Abnormal; Notable for the following:     Magnesium 1.4 (*)     All other components within normal limits   CK - Abnormal; Notable for the following:     Creatine Kinase 54 (*)     All other components within normal limits   CBC WITH AUTO DIFFERENTIAL - Abnormal; Notable for the following:     WBC 12.82 (*)     Immature Grans, Absolute 0.05 (*)     All other components within normal limits   URINALYSIS W/ MICROSCOPIC IF INDICATED (NO CULTURE) - Normal    Narrative:     Urine microscopic not indicated.   TSH - Normal   CK MB - Normal   TROPONIN (IN-HOUSE) - Normal   CBC AND DIFFERENTIAL    Narrative:     The following orders were created for panel order CBC & Differential.  Procedure                               Abnormality         Status                     ---------                               -----------         ------                     CBC Auto Differential[[id number]]        Abnormal            Final result                 Please view results for these tests on the individual orders.   EXTRA TUBES    Narrative:     The following orders were created for panel order Extra Tubes.  Procedure                                Abnormality         Status                     ---------                               -----------         ------                     Light Blue Top[[id number]]                                   Final result                 Please view results for these tests on the individual orders.   LIGHT BLUE TOP       XR Chest 2 View   Final Result   CONCLUSION:   No Acute Disease      [id number]      Electronically signed by:  Sky Felix MD  9/3/2018 7:58 PM CDT   Workstation: [id number]                  [facility]      Final diagnoses:   Hypomagnesemia            Sriram Harrison MD  09/03/18 2316

## 2018-09-06 ENCOUNTER — LAB (OUTPATIENT)
Dept: LAB | Facility: HOSPITAL | Age: 48
End: 2018-09-06

## 2018-09-06 ENCOUNTER — OFFICE VISIT (OUTPATIENT)
Dept: FAMILY MEDICINE CLINIC | Facility: CLINIC | Age: 48
End: 2018-09-06

## 2018-09-06 VITALS
OXYGEN SATURATION: 99 % | BODY MASS INDEX: 32.21 KG/M2 | HEIGHT: 71 IN | WEIGHT: 230.1 LBS | TEMPERATURE: 96.8 F | RESPIRATION RATE: 20 BRPM

## 2018-09-06 DIAGNOSIS — R00.2 PALPITATIONS: ICD-10-CM

## 2018-09-06 DIAGNOSIS — R19.7 DIARRHEA, UNSPECIFIED TYPE: ICD-10-CM

## 2018-09-06 DIAGNOSIS — R42 DIZZINESS: ICD-10-CM

## 2018-09-06 DIAGNOSIS — E83.42 HYPOMAGNESEMIA: ICD-10-CM

## 2018-09-06 DIAGNOSIS — E83.42 HYPOMAGNESEMIA: Primary | ICD-10-CM

## 2018-09-06 LAB
ALBUMIN SERPL-MCNC: 4.6 G/DL (ref 3.4–4.8)
ALBUMIN UR-MCNC: 11.2 MG/L
ALBUMIN/GLOB SERPL: 1.3 G/DL (ref 1.1–1.8)
ALP SERPL-CCNC: 77 U/L (ref 38–126)
ALT SERPL W P-5'-P-CCNC: 43 U/L (ref 21–72)
ANION GAP SERPL CALCULATED.3IONS-SCNC: 11 MMOL/L (ref 5–15)
AST SERPL-CCNC: 28 U/L (ref 17–59)
BACTERIA UR QL AUTO: ABNORMAL /HPF
BILIRUB SERPL-MCNC: 0.4 MG/DL (ref 0.2–1.3)
BILIRUB UR QL STRIP: ABNORMAL
BUN BLD-MCNC: 21 MG/DL (ref 7–21)
BUN/CREAT SERPL: 22.1 (ref 7–25)
CALCIUM SPEC-SCNC: 9.2 MG/DL (ref 8.4–10.2)
CHLORIDE SERPL-SCNC: 106 MMOL/L (ref 95–110)
CLARITY UR: CLEAR
CO2 SERPL-SCNC: 26 MMOL/L (ref 22–31)
COLOR UR: YELLOW
CREAT BLD-MCNC: 0.95 MG/DL (ref 0.7–1.3)
CREAT UR-MCNC: 279 MG/DL
GFR SERPL CREATININE-BSD FRML MDRD: 84 ML/MIN/1.73 (ref 56–130)
GLOBULIN UR ELPH-MCNC: 3.6 GM/DL (ref 2.3–3.5)
GLUCOSE BLD-MCNC: 136 MG/DL (ref 60–100)
GLUCOSE UR STRIP-MCNC: NEGATIVE MG/DL
HGB UR QL STRIP.AUTO: NEGATIVE
HYALINE CASTS UR QL AUTO: ABNORMAL /LPF
KETONES UR QL STRIP: ABNORMAL
LEUKOCYTE ESTERASE UR QL STRIP.AUTO: NEGATIVE
MAGNESIUM SERPL-MCNC: 1.5 MG/DL (ref 1.6–2.3)
MAGNESIUM UR-MCNC: 5.4 MG/DL
MICROALBUMIN/CREAT UR: 40.1 MG/G (ref 0–30)
NITRITE UR QL STRIP: NEGATIVE
PH UR STRIP.AUTO: <=5 [PH] (ref 5–9)
POTASSIUM BLD-SCNC: 4.2 MMOL/L (ref 3.5–5.1)
POTASSIUM UR-SCNC: 59.5 MMOL/L
PROT SERPL-MCNC: 8.2 G/DL (ref 6.3–8.6)
PROT UR QL STRIP: ABNORMAL
RBC # UR: ABNORMAL /HPF
REF LAB TEST METHOD: ABNORMAL
SODIUM BLD-SCNC: 143 MMOL/L (ref 137–145)
SP GR UR STRIP: 1.03 (ref 1–1.03)
SQUAMOUS #/AREA URNS HPF: ABNORMAL /HPF
UROBILINOGEN UR QL STRIP: ABNORMAL
WBC UR QL AUTO: ABNORMAL /HPF

## 2018-09-06 PROCEDURE — 84133 ASSAY OF URINE POTASSIUM: CPT

## 2018-09-06 PROCEDURE — 83735 ASSAY OF MAGNESIUM: CPT

## 2018-09-06 PROCEDURE — 80053 COMPREHEN METABOLIC PANEL: CPT

## 2018-09-06 PROCEDURE — 36415 COLL VENOUS BLD VENIPUNCTURE: CPT

## 2018-09-06 PROCEDURE — 82570 ASSAY OF URINE CREATININE: CPT | Performed by: NURSE PRACTITIONER

## 2018-09-06 PROCEDURE — 82043 UR ALBUMIN QUANTITATIVE: CPT | Performed by: NURSE PRACTITIONER

## 2018-09-06 PROCEDURE — 99214 OFFICE O/P EST MOD 30 MIN: CPT | Performed by: NURSE PRACTITIONER

## 2018-09-06 PROCEDURE — 81001 URINALYSIS AUTO W/SCOPE: CPT

## 2018-09-06 NOTE — PROGRESS NOTES
Subjective   Jak Valle is a 48 y.o. male.     Mr. Valle is a 48 year old male who presents today for ER follow related to dizziness, palpitations and soa. These episodes have been occurring over 2 years but have increased in frequency and intensity over that time. No known cardiac history. ER notes reviewed. Discharge diagnosis was hypomagnesemia. This is a new problem previously not known to the patient. He has no known renal issues. He does report intermittent diarrhea over the past 2 years also.          The following portions of the patient's history were reviewed and updated as appropriate: allergies, current medications, past family history, past medical history, past social history, past surgical history and problem list.    Review of Systems   Constitutional: Negative for activity change, appetite change, fatigue, unexpected weight gain and unexpected weight loss.   HENT: Negative for congestion, sore throat and trouble swallowing.    Eyes: Negative for pain.   Respiratory: Positive for shortness of breath. Negative for cough, chest tightness and wheezing.         Only occurs with episodes of palpitations.    Cardiovascular: Positive for palpitations. Negative for chest pain and leg swelling.        Have occurred at random over the past 2 years. Symptoms have become more intense and more frequent since that time. No known cardiac history. No chest pain. Soa only occurs with episodes of palpitations. Patient recently evaluated in ER with no signigicvatn findings other than hypomagnesemia. This is a new finding to the patient. He has no known renal issues. He does alternate between having a normal bowel movement and diarrhea.    Gastrointestinal: Positive for diarrhea. Negative for abdominal distention, abdominal pain, anal bleeding, blood in stool, constipation, nausea, vomiting, GERD and indigestion.        Reports intermittent diarrhea x 2+ years. Denies constipation. Denies watery diarrhea.     Endocrine: Negative for cold intolerance, heat intolerance, polydipsia, polyphagia and polyuria.   Genitourinary: Negative for frequency.   Musculoskeletal: Negative for arthralgias and myalgias.   Skin: Negative for rash.   Neurological: Positive for dizziness.   Psychiatric/Behavioral: Negative.        Objective   Physical Exam   Constitutional: He is oriented to person, place, and time. He appears well-developed and well-nourished. No distress.   HENT:   Head: Normocephalic and atraumatic.   Eyes: Conjunctivae are normal.   Neck: Normal range of motion.   Cardiovascular: Normal rate, regular rhythm and normal heart sounds.    Pulmonary/Chest: Effort normal and breath sounds normal. No respiratory distress. He has no wheezes. He has no rales.   Abdominal: Soft. Bowel sounds are normal. He exhibits no distension and no mass. There is no tenderness. There is no rebound and no guarding. No hernia.   Musculoskeletal: Normal range of motion.   Neurological: He is alert and oriented to person, place, and time.   Skin: Skin is warm and dry. No rash noted. He is not diaphoretic. No erythema. No pallor.   Psychiatric: He has a normal mood and affect. His behavior is normal. Judgment and thought content normal.   Nursing note and vitals reviewed.        Assessment/Plan   Jak was seen today for follow-up and dizziness.    Diagnoses and all orders for this visit:    Hypomagnesemia  -     Urinalysis With Microscopic If Indicated (No Culture) - Urine, Clean Catch; Future  -     Potassium, Urine, Random - Urine, Clean Catch; Future  -     Magnesium, Urine - Urine, Clean Catch; Future  -     Magnesium; Future    Palpitations  -     Potassium, Urine, Random - Urine, Clean Catch; Future  -     Comprehensive Metabolic Panel; Future  -     Magnesium; Future    Dizziness  -     Potassium, Urine, Random - Urine, Clean Catch; Future  -     Magnesium; Future    Diarrhea, unspecified type  -     Potassium, Urine, Random - Urine,  Clean Catch; Future  -     Comprehensive Metabolic Panel; Future    Other orders  -     magnesium oxide (MAGOX 400) 400 (241.3 Mg) MG tablet tablet; Take 1 tablet by mouth Daily.    1. Hypomagnesemia- Urine and serum magnesium.  MagOx reordered. Will call with lab results.     2. Palpatations- improved since starting magnesium therapy. No changes in plan of care at this time.     3. Dizziness- improved since starting on magnesium therapy. No changes in plan of care at this time.     4. Diarrhea- will need evaluated further if urine magnesium and potassium are WNL as a possible source of electrolyte loss.     5. Follow up in 2 weeks with PCP or sooner as needed.             This document has been electronically signed by TWIN White on September 6, 2018 5:01 PM

## 2018-09-06 NOTE — PROGRESS NOTES
Serum magnesium still low but is up to 1.5. Continue medication and follow up with PCP as recommended. Go to the ER if symptoms come back.

## 2018-09-07 DIAGNOSIS — E83.42 HYPOMAGNESEMIA: Primary | ICD-10-CM

## 2018-09-25 ENCOUNTER — LAB (OUTPATIENT)
Dept: LAB | Facility: HOSPITAL | Age: 48
End: 2018-09-25

## 2018-09-25 ENCOUNTER — OFFICE VISIT (OUTPATIENT)
Dept: FAMILY MEDICINE CLINIC | Facility: CLINIC | Age: 48
End: 2018-09-25

## 2018-09-25 ENCOUNTER — TELEPHONE (OUTPATIENT)
Dept: FAMILY MEDICINE CLINIC | Facility: CLINIC | Age: 48
End: 2018-09-25

## 2018-09-25 VITALS
WEIGHT: 226.4 LBS | BODY MASS INDEX: 31.69 KG/M2 | DIASTOLIC BLOOD PRESSURE: 80 MMHG | SYSTOLIC BLOOD PRESSURE: 120 MMHG | HEIGHT: 71 IN

## 2018-09-25 DIAGNOSIS — E83.42 HYPOMAGNESEMIA: ICD-10-CM

## 2018-09-25 DIAGNOSIS — E11.65 TYPE 2 DIABETES MELLITUS WITH HYPERGLYCEMIA, WITHOUT LONG-TERM CURRENT USE OF INSULIN (HCC): ICD-10-CM

## 2018-09-25 DIAGNOSIS — R42 DIZZINESS: Primary | ICD-10-CM

## 2018-09-25 DIAGNOSIS — E83.42 HYPOMAGNESEMIA: Primary | ICD-10-CM

## 2018-09-25 LAB
ALBUMIN SERPL-MCNC: 4.6 G/DL (ref 3.4–4.8)
ALBUMIN/GLOB SERPL: 1.3 G/DL (ref 1.1–1.8)
ALP SERPL-CCNC: 73 U/L (ref 38–126)
ALT SERPL W P-5'-P-CCNC: 42 U/L (ref 21–72)
ANION GAP SERPL CALCULATED.3IONS-SCNC: 13 MMOL/L (ref 5–15)
AST SERPL-CCNC: 24 U/L (ref 17–59)
BILIRUB SERPL-MCNC: 0.5 MG/DL (ref 0.2–1.3)
BUN BLD-MCNC: 15 MG/DL (ref 7–21)
BUN/CREAT SERPL: 14.7 (ref 7–25)
CALCIUM SPEC-SCNC: 10 MG/DL (ref 8.4–10.2)
CHLORIDE SERPL-SCNC: 100 MMOL/L (ref 95–110)
CO2 SERPL-SCNC: 28 MMOL/L (ref 22–31)
CREAT BLD-MCNC: 1.02 MG/DL (ref 0.7–1.3)
GFR SERPL CREATININE-BSD FRML MDRD: 77 ML/MIN/1.73 (ref 56–130)
GLOBULIN UR ELPH-MCNC: 3.6 GM/DL (ref 2.3–3.5)
GLUCOSE BLD-MCNC: 130 MG/DL (ref 60–100)
MAGNESIUM SERPL-MCNC: 1.4 MG/DL (ref 1.6–2.3)
POTASSIUM BLD-SCNC: 4.2 MMOL/L (ref 3.5–5.1)
PROT SERPL-MCNC: 8.2 G/DL (ref 6.3–8.6)
SODIUM BLD-SCNC: 141 MMOL/L (ref 137–145)

## 2018-09-25 PROCEDURE — 80053 COMPREHEN METABOLIC PANEL: CPT | Performed by: NURSE PRACTITIONER

## 2018-09-25 PROCEDURE — 99213 OFFICE O/P EST LOW 20 MIN: CPT | Performed by: NURSE PRACTITIONER

## 2018-09-25 PROCEDURE — 83735 ASSAY OF MAGNESIUM: CPT

## 2018-09-25 NOTE — PROGRESS NOTES
Per TWIN Coronel, Mr. Valle has been called with his recent lab results & recommendations.  Continue his current medications and follow-up as planned or sooner if any problems.

## 2018-09-25 NOTE — PROGRESS NOTES
"Subjective   Jak Valle is a 48 y.o. male.  Three month follow-up for diabetes.  For little over the past month has been complaining of intermittent dizzy spells with fatigue and tiredness.  Was evaluated at the emergency department on September 3 and diagnosed with hyperal magna see me up.  Had a follow-up on September 6, 2018 with David Chisholm.  Today he continues to complain of \"a little bit of dizziness but it is getting much better.\"    Diabetes   He presents for his initial diabetic visit. He has type 2 diabetes mellitus. No MedicAlert identification noted. The initial diagnosis of diabetes was made 3 years ago. His disease course has been stable. Hypoglycemia symptoms include dizziness, nervousness/anxiousness, sleepiness and tremors. Pertinent negatives for hypoglycemia include no confusion, hunger, mood changes, pallor, seizures, speech difficulty or sweats. Associated symptoms include fatigue and weakness. Pertinent negatives for diabetes include no polydipsia and no polyuria. Pertinent negatives for hypoglycemia complications include no blackouts, no hospitalization, no nocturnal hypoglycemia, no required assistance and no required glucagon injection. Symptoms are improving. Diabetic complications include PVD. Pertinent negatives for diabetic complications include no CVA, heart disease, impotence, nephropathy, peripheral neuropathy or retinopathy. Risk factors for coronary artery disease include dyslipidemia, hypertension, obesity, sedentary lifestyle, stress and tobacco exposure. Current diabetic treatment includes diet and oral agent (monotherapy). He is compliant with treatment most of the time. His weight is fluctuating minimally. He is following a generally healthy diet. Meal planning includes avoidance of concentrated sweets. He has not had a previous visit with a dietitian. He rarely participates in exercise. He monitors blood glucose at home 1-2 x per day. Blood glucose monitoring compliance " is adequate. His home blood glucose trend is fluctuating minimally. His breakfast blood glucose is taken between 8-9 am. His breakfast blood glucose range is generally 110-130 mg/dl. His dinner blood glucose is taken between 7-8 pm. His dinner blood glucose range is generally 140-180 mg/dl. His overall blood glucose range is 180-200 mg/dl.   Dizziness   This is a recurrent problem. The current episode started more than 1 month ago. The problem occurs constantly. Associated symptoms include fatigue and weakness. The symptoms are aggravated by exertion. He has tried rest and relaxation for the symptoms. The treatment provided moderate relief.        The following portions of the patient's history were reviewed and updated as appropriate: past family history, past medical history and past social history.    Review of Systems   Constitutional: Positive for fatigue.   HENT: Negative.    Eyes: Negative.    Respiratory: Negative.    Gastrointestinal: Negative.    Endocrine: Negative for polydipsia and polyuria.   Genitourinary: Negative.  Negative for impotence.   Musculoskeletal: Negative.    Skin: Negative.  Negative for pallor.   Neurological: Positive for dizziness, tremors and weakness. Negative for seizures and speech difficulty.   Psychiatric/Behavioral: Negative for confusion. The patient is nervous/anxious.        Objective   Physical Exam   Constitutional: He is oriented to person, place, and time. He appears well-developed and well-nourished.   HENT:   Head: Normocephalic.   Right Ear: External ear normal.   Left Ear: External ear normal.   Eyes: Pupils are equal, round, and reactive to light. EOM are normal.   Neck: Normal range of motion. Neck supple.   Cardiovascular: Normal rate, regular rhythm and normal heart sounds.    Pulmonary/Chest: Effort normal and breath sounds normal.   Abdominal: Soft. Bowel sounds are normal.   Musculoskeletal: Normal range of motion.   Neurological: He is alert and oriented to  "person, place, and time.   Skin: Skin is warm. Capillary refill takes less than 2 seconds.   Psychiatric: He has a normal mood and affect. His behavior is normal.   Nursing note and vitals reviewed.      Assessment/Plan   Problems Addressed this Visit        Endocrine    Type 2 diabetes mellitus with hyperglycemia (CMS/[facility])      Other Visit Diagnoses     Dizziness    -  Primary    Relevant Orders    Comprehensive Metabolic Panel        1.  Dizziness:  Complete magnesium and chemistry panel is ordered and will notify of results when available  Encouraged to continue on Mag-Ox as previously prescribed  Encouraged to continue to increase fluids to help promote hydration    2.  Type 2 diabetes mellitus with hyperglycemia:  Continue on Glucophage as previously prescribed  Encouraged to adhere to ADA diet  Reports sugars have been running \"in the 150s which is the best they've been for me.\"    Continue on current medications as previously prescribed   Schedule follow-up appointment with this office in 1 month for recheck of dizziness and fatigue or sooner as needed         This document has been electronically signed by TWIN Benavidez on September 25, 2018 2:49 PM            "

## 2018-09-27 ENCOUNTER — TELEPHONE (OUTPATIENT)
Dept: FAMILY MEDICINE CLINIC | Facility: CLINIC | Age: 48
End: 2018-09-27

## 2018-10-31 DIAGNOSIS — E11.65 TYPE 2 DIABETES MELLITUS WITH HYPERGLYCEMIA, WITHOUT LONG-TERM CURRENT USE OF INSULIN (HCC): ICD-10-CM

## 2019-01-14 DIAGNOSIS — E78.2 ELEVATED CHOLESTEROL WITH ELEVATED TRIGLYCERIDES: ICD-10-CM

## 2019-01-14 RX ORDER — ATORVASTATIN CALCIUM 40 MG/1
40 TABLET, FILM COATED ORAL
Qty: 30 TABLET | Refills: 5 | Status: SHIPPED | OUTPATIENT
Start: 2019-01-14 | End: 2020-05-31 | Stop reason: SDUPTHER

## 2019-02-25 DIAGNOSIS — E83.42 HYPOMAGNESEMIA: ICD-10-CM

## 2019-03-01 ENCOUNTER — OFFICE VISIT (OUTPATIENT)
Dept: FAMILY MEDICINE CLINIC | Facility: CLINIC | Age: 49
End: 2019-03-01

## 2019-03-01 ENCOUNTER — HOSPITAL ENCOUNTER (OUTPATIENT)
Dept: ULTRASOUND IMAGING | Facility: HOSPITAL | Age: 49
Discharge: HOME | End: 2019-03-01
Admitting: NURSE PRACTITIONER

## 2019-03-01 ENCOUNTER — LAB (OUTPATIENT)
Dept: LAB | Facility: HOSPITAL | Age: 49
End: 2019-03-01

## 2019-03-01 ENCOUNTER — HOSPITAL ENCOUNTER (OUTPATIENT)
Dept: ULTRASOUND IMAGING | Facility: HOSPITAL | Age: 49
Discharge: HOME | End: 2019-03-01

## 2019-03-01 VITALS
DIASTOLIC BLOOD PRESSURE: 90 MMHG | WEIGHT: 230.6 LBS | SYSTOLIC BLOOD PRESSURE: 140 MMHG | BODY MASS INDEX: 32.28 KG/M2 | HEIGHT: 71 IN | RESPIRATION RATE: 18 BRPM | HEART RATE: 109 BPM | OXYGEN SATURATION: 98 %

## 2019-03-01 DIAGNOSIS — E83.42 HYPOMAGNESEMIA: ICD-10-CM

## 2019-03-01 DIAGNOSIS — N50.89 TESTICULAR MASS: ICD-10-CM

## 2019-03-01 DIAGNOSIS — N50.812 TESTICULAR PAIN, LEFT: ICD-10-CM

## 2019-03-01 DIAGNOSIS — E11.9 TYPE 2 DIABETES MELLITUS WITHOUT COMPLICATION, WITHOUT LONG-TERM CURRENT USE OF INSULIN (HCC): ICD-10-CM

## 2019-03-01 DIAGNOSIS — N50.812 TESTICULAR PAIN, LEFT: Primary | ICD-10-CM

## 2019-03-01 LAB
ALBUMIN SERPL-MCNC: 4.6 G/DL (ref 3.4–4.8)
ALBUMIN/GLOB SERPL: 1.3 G/DL (ref 1.1–1.8)
ALP SERPL-CCNC: 91 U/L (ref 38–126)
ALT SERPL W P-5'-P-CCNC: 43 U/L (ref 21–72)
ANION GAP SERPL CALCULATED.3IONS-SCNC: 12 MMOL/L (ref 5–15)
AST SERPL-CCNC: 27 U/L (ref 17–59)
BILIRUB SERPL-MCNC: 0.4 MG/DL (ref 0.2–1.3)
BUN BLD-MCNC: 15 MG/DL (ref 7–21)
BUN/CREAT SERPL: 17 (ref 7–25)
CALCIUM SPEC-SCNC: 9.7 MG/DL (ref 8.4–10.2)
CHLORIDE SERPL-SCNC: 102 MMOL/L (ref 95–110)
CO2 SERPL-SCNC: 25 MMOL/L (ref 22–31)
CREAT BLD-MCNC: 0.88 MG/DL (ref 0.7–1.3)
GFR SERPL CREATININE-BSD FRML MDRD: 92 ML/MIN/1.73 (ref 56–130)
GLOBULIN UR ELPH-MCNC: 3.5 GM/DL (ref 2.3–3.5)
GLUCOSE BLD-MCNC: 284 MG/DL (ref 60–100)
HBA1C MFR BLD: 9.2 % (ref 4–5.6)
MAGNESIUM SERPL-MCNC: 1.4 MG/DL (ref 1.6–2.3)
POTASSIUM BLD-SCNC: 4.3 MMOL/L (ref 3.5–5.1)
PROT SERPL-MCNC: 8.1 G/DL (ref 6.3–8.6)
SODIUM BLD-SCNC: 139 MMOL/L (ref 137–145)

## 2019-03-01 PROCEDURE — 99213 OFFICE O/P EST LOW 20 MIN: CPT | Performed by: NURSE PRACTITIONER

## 2019-03-01 PROCEDURE — 80053 COMPREHEN METABOLIC PANEL: CPT

## 2019-03-01 PROCEDURE — 83036 HEMOGLOBIN GLYCOSYLATED A1C: CPT

## 2019-03-01 PROCEDURE — 36415 COLL VENOUS BLD VENIPUNCTURE: CPT

## 2019-03-01 PROCEDURE — 76870 US EXAM SCROTUM: CPT

## 2019-03-01 PROCEDURE — 93976 VASCULAR STUDY: CPT

## 2019-03-01 PROCEDURE — 83735 ASSAY OF MAGNESIUM: CPT

## 2019-03-01 NOTE — PROGRESS NOTES
Subjective   Jak Valle is a 48 y.o. male.     Mr. Valle is a 48-year-old male who presents today with a one year history of left testicular mass.  Patient states he noticed a mass on the superior side of his left testicle for more than 1 year.  However it has only become painful within the last week.  He denies any increase in size of mass, dysuria, hematuria, testicular edema, fevers, chills.  This is the first time he has been evaluated for this condition.    Mr. Valle also request routine blood work related to his type 2 diabetes, hypomagnesemia and hypokalemia.  He denies any hyper or hypoglycemic episodes.  He denies chest pain, shortness of breath, palpitations.         The following portions of the patient's history were reviewed and updated as appropriate: allergies, current medications, past family history, past medical history, past social history, past surgical history and problem list.    Review of Systems   Constitutional: Negative for activity change, appetite change, fatigue, unexpected weight gain and unexpected weight loss.   HENT: Negative for congestion, sore throat, trouble swallowing and voice change.    Eyes: Negative.    Respiratory: Negative for cough, chest tightness, shortness of breath and wheezing.    Cardiovascular: Negative for chest pain, palpitations and leg swelling.   Gastrointestinal: Negative for abdominal pain, diarrhea, nausea and vomiting.   Endocrine: Negative.    Genitourinary: Positive for testicular pain. Negative for urinary incontinence, decreased urine volume, difficulty urinating, discharge, dysuria, flank pain, frequency, genital sores, hematuria, penile pain, penile swelling, scrotal swelling and urgency.   Musculoskeletal: Negative for arthralgias and myalgias.   Skin: Negative for rash.   Neurological: Negative for dizziness, weakness, light-headedness and headache.   Hematological: Negative.    Psychiatric/Behavioral: Negative.        Objective   Physical  Exam   Constitutional: He is oriented to person, place, and time. He appears well-developed and well-nourished. No distress.   HENT:   Head: Normocephalic and atraumatic.   Eyes: Conjunctivae are normal.   Neck: Normal range of motion.   Cardiovascular: Normal rate, regular rhythm, normal heart sounds and intact distal pulses. Exam reveals no gallop and no friction rub.   No murmur heard.  Pulmonary/Chest: Effort normal and breath sounds normal. No respiratory distress. He has no wheezes. He has no rales.   Abdominal: Soft. Bowel sounds are normal. He exhibits no distension and no mass. There is no tenderness. There is no rebound and no guarding. No hernia.   Genitourinary: Right testis shows no mass, no swelling and no tenderness. Left testis shows mass and tenderness. Left testis shows no swelling.         Musculoskeletal: Normal range of motion. He exhibits no edema or tenderness.   Neurological: He is alert and oriented to person, place, and time.   Skin: Skin is warm and dry. No rash noted. He is not diaphoretic. No erythema. No pallor.   Psychiatric: He has a normal mood and affect. His behavior is normal. Judgment and thought content normal.   Nursing note and vitals reviewed.        Assessment/Plan   Jak was seen today for testicle pain.    Diagnoses and all orders for this visit:    Testicular pain, left  -     Urinalysis With Microscopic If Indicated (No Culture) - Urine, Clean Catch; Future  -     US Scrotum & Testicles  -     Ambulatory Referral to Urology  -     PSA Screen    Testicular mass  -     Urinalysis With Microscopic If Indicated (No Culture) - Urine, Clean Catch; Future  -     US Scrotum & Testicles  -     Ambulatory Referral to Urology  -     PSA Screen    Type 2 diabetes mellitus without complication, without long-term current use of insulin (CMS/[facility])  -     Hemoglobin A1c; Future    Hypomagnesemia  -     Comprehensive Metabolic Panel; Future  -     Magnesium; Future    1.  Left  testicular pain/mass-UA, PSA, ultrasound of scrotum and testicle.  Will call with results.  Referral to urology for further evaluation.    2.  Type 2 diabetes-hemoglobin A1c, CMP.  Will call with results.  Patient instructed to follow-up with PCP.    3.  Hypomagnesemia- magnesium level.  Will call with results.  Patient instructed to follow-up with PCP.    4.  Follow-up with PCP as soon as possible for routine health needs.  Follow-up with urology when scheduled.  Follow-up sooner if symptoms worsen.            This document has been electronically signed by TWIN White on March 1, 2019 12:53 PM

## 2019-03-01 NOTE — PROGRESS NOTES
Cyst noted on ultrasound per radiology report.  Magnesium low, 1.4.  I believe [facility] has refilled his magnesium.  Have him restart this immediately.  UA and hemoglobin A1c pending.  He needs to schedule follow-up with [facility] for his diabetes and magnesium.

## 2019-03-01 NOTE — PROGRESS NOTES
Small cyst noted on ultrasound per radiology report.  Follow-up with Dr. Walls as scheduled.  Follow-up sooner for worsening symptoms.

## 2019-10-21 DIAGNOSIS — E11.9 TYPE 2 DIABETES MELLITUS WITHOUT COMPLICATION, WITHOUT LONG-TERM CURRENT USE OF INSULIN (HCC): Primary | ICD-10-CM

## 2019-11-19 ENCOUNTER — OFFICE VISIT (OUTPATIENT)
Dept: FAMILY MEDICINE CLINIC | Facility: CLINIC | Age: 49
End: 2019-11-19

## 2019-11-19 VITALS
DIASTOLIC BLOOD PRESSURE: 84 MMHG | WEIGHT: 234 LBS | BODY MASS INDEX: 31.69 KG/M2 | SYSTOLIC BLOOD PRESSURE: 136 MMHG | HEIGHT: 72 IN

## 2019-11-19 DIAGNOSIS — Z00.00 ANNUAL PHYSICAL EXAM: Primary | ICD-10-CM

## 2019-11-19 DIAGNOSIS — E78.2 MIXED HYPERLIPIDEMIA: ICD-10-CM

## 2019-11-19 DIAGNOSIS — Z12.11 SCREENING FOR COLON CANCER: ICD-10-CM

## 2019-11-19 DIAGNOSIS — E11.65 TYPE 2 DIABETES MELLITUS WITH HYPERGLYCEMIA, WITHOUT LONG-TERM CURRENT USE OF INSULIN (HCC): ICD-10-CM

## 2019-11-19 DIAGNOSIS — Z13.29 SCREENING FOR HYPOTHYROIDISM: ICD-10-CM

## 2019-11-19 DIAGNOSIS — Z12.5 SCREENING FOR MALIGNANT NEOPLASM OF PROSTATE: ICD-10-CM

## 2019-11-19 DIAGNOSIS — R42 DIZZINESS: ICD-10-CM

## 2019-11-19 PROBLEM — R41.840 DIFFICULTY CONCENTRATING: Status: ACTIVE | Noted: 2018-10-11

## 2019-11-19 PROCEDURE — 99396 PREV VISIT EST AGE 40-64: CPT | Performed by: NURSE PRACTITIONER

## 2019-11-19 RX ORDER — MECLIZINE HYDROCHLORIDE 25 MG/1
25 TABLET ORAL 3 TIMES DAILY PRN
Qty: 90 TABLET | Refills: 1 | Status: SHIPPED | OUTPATIENT
Start: 2019-11-19 | End: 2023-03-06

## 2019-11-19 NOTE — PROGRESS NOTES
Subjective   Jak Valle is a 49 y.o. male.  Annual physical exam.  Continues to complain of intermittent dizziness    HPI:  Annual physical exam       Diabetes   He presents for his initial diabetic visit. He has type 2 diabetes mellitus. No MedicAlert identification noted. The initial diagnosis of diabetes was made 3 years ago. His disease course has been stable. Hypoglycemia symptoms include dizziness and sleepiness. Pertinent negatives for hypoglycemia include no confusion, hunger, mood changes, speech difficulty or sweats. Pertinent negatives for diabetes include no chest pain, no fatigue, no polydipsia and no polyuria. Pertinent negatives for hypoglycemia complications include no blackouts, no hospitalization, no nocturnal hypoglycemia, no required assistance and no required glucagon injection. Symptoms are improving. Diabetic complications include PVD. Pertinent negatives for diabetic complications include no CVA, heart disease, nephropathy, peripheral neuropathy or retinopathy. Risk factors for coronary artery disease include dyslipidemia, hypertension, obesity, sedentary lifestyle, stress and tobacco exposure. Current diabetic treatment includes diet and oral agent (monotherapy). He is compliant with treatment most of the time. His weight is fluctuating minimally. He is following a generally healthy diet. Meal planning includes avoidance of concentrated sweets. He has not had a previous visit with a dietitian. He rarely participates in exercise. He monitors blood glucose at home 1-2 x per day. Blood glucose monitoring compliance is adequate. His home blood glucose trend is fluctuating minimally. His breakfast blood glucose is taken between 8-9 am. His breakfast blood glucose range is generally 110-130 mg/dl. His dinner blood glucose is taken between 7-8 pm. His dinner blood glucose range is generally 140-180 mg/dl. His overall blood glucose range is 180-200 mg/dl.   Dizziness   This is a recurrent  problem. The current episode started more than 1 month ago. The problem occurs constantly. Pertinent negatives include no chest pain, chills, diaphoresis, fatigue, fever or myalgias. The symptoms are aggravated by exertion. He has tried rest and relaxation for the symptoms. The treatment provided moderate relief.   Hyperlipidemia   This is a chronic problem. The current episode started more than 1 year ago. The problem is controlled. Exacerbating diseases include obesity. Factors aggravating his hyperlipidemia include fatty foods and smoking. Pertinent negatives include no chest pain, myalgias or shortness of breath. Current antihyperlipidemic treatment includes statins. The current treatment provides mild improvement of lipids. Compliance problems include adherence to diet and adherence to exercise.  Risk factors for coronary artery disease include diabetes mellitus, dyslipidemia, family history, male sex, obesity and a sedentary lifestyle.        The following portions of the patient's history were reviewed and updated as appropriate:   He  has a past medical history of Adopted, Amblyopia, Diabetes mellitus type 2 in obese (CMS/HCC), Dyslipidemia, Exotropia, GERD (gastroesophageal reflux disease), Hypertensive disorder, Major depressive disorder, Major depressive disorder, recurrent (CMS/HCC), Microalbuminuria, Myopia, Obesity, and Type 2 diabetes mellitus with hyperglycemia (CMS/HCC).  He does not have any pertinent problems on file.  He  has a past surgical history that includes Umbilical hernia repair (04/01/2014); Tonsillectomy; Adenoidectomy; and Ear Tubes Removal.  His family history includes No Known Problems in his mother. He was adopted.  He  reports that he has been smoking cigarettes.  He has never used smokeless tobacco. He reports that he does not drink alcohol or use drugs.  Current Outpatient Medications   Medication Sig Dispense Refill   • aspirin 81 MG chewable tablet Chew 81 mg Daily.     •  "atorvastatin (LIPITOR) 40 MG tablet Take 1 tablet by mouth every night at bedtime. 30 tablet 5   • magnesium oxide (MAGOX 400) 400 (241.3 Mg) MG tablet tablet Take 2 tablets by mouth Daily. (Patient taking differently: Take 400 mg by mouth Daily.) 60 each 3   • metFORMIN (GLUCOPHAGE) 1000 MG tablet Take 1 tablet by mouth 2 (Two) Times a Day With Meals. 60 tablet 3   • meclizine (ANTIVERT) 25 MG tablet Take 1 tablet by mouth 3 (Three) Times a Day As Needed for Dizziness. 90 tablet 1     No current facility-administered medications for this visit.      Current Outpatient Medications on File Prior to Visit   Medication Sig   • aspirin 81 MG chewable tablet Chew 81 mg Daily.   • atorvastatin (LIPITOR) 40 MG tablet Take 1 tablet by mouth every night at bedtime.   • magnesium oxide (MAGOX 400) 400 (241.3 Mg) MG tablet tablet Take 2 tablets by mouth Daily. (Patient taking differently: Take 400 mg by mouth Daily.)   • metFORMIN (GLUCOPHAGE) 1000 MG tablet Take 1 tablet by mouth 2 (Two) Times a Day With Meals.     No current facility-administered medications on file prior to visit.      He is allergic to januvia [sitagliptin]..    Review of Systems   Constitutional: Negative.  Negative for chills, diaphoresis, fatigue and fever.   HENT: Negative.    Eyes: Negative.    Respiratory: Negative.  Negative for shortness of breath.    Cardiovascular: Negative.  Negative for chest pain.   Gastrointestinal: Negative.    Endocrine: Negative for polydipsia and polyuria.   Genitourinary: Negative.    Musculoskeletal: Negative.  Negative for myalgias.   Skin: Negative.    Neurological: Positive for dizziness. Negative for speech difficulty.   Psychiatric/Behavioral: Negative.  Negative for confusion.       Objective    Visit Vitals  /84   Ht 182.9 cm (72\")   Wt 106 kg (234 lb)   BMI 31.74 kg/m²       Physical Exam   Constitutional: He is oriented to person, place, and time. He appears well-developed and well-nourished.   HENT: "   Head: Normocephalic.   Right Ear: External ear normal.   Left Ear: External ear normal.   Eyes: EOM are normal. Pupils are equal, round, and reactive to light.   Neck: Normal range of motion. Neck supple.   Cardiovascular: Normal rate, regular rhythm and normal heart sounds.   Pulmonary/Chest: Effort normal and breath sounds normal.   Abdominal: Soft. Bowel sounds are normal.   Musculoskeletal: Normal range of motion.   Neurological: He is alert and oriented to person, place, and time.   Skin: Skin is warm. Capillary refill takes less than 2 seconds.   Psychiatric: He has a normal mood and affect. His behavior is normal.   Nursing note and vitals reviewed.      Assessment/Plan   Problems Addressed this Visit        Endocrine    Type 2 diabetes mellitus with hyperglycemia (CMS/HCC)    Relevant Orders    CBC & Differential    Comprehensive Metabolic Panel    Hemoglobin A1c    Microalbumin / Creatinine Urine Ratio - Urine, Clean Catch    Ambulatory Referral for Diabetic Eye Exam-Ophthalmology      Other Visit Diagnoses     Annual physical exam    -  Primary    Screening for colon cancer        Relevant Orders    Ambulatory Referral For Screening Colonoscopy    Screening for hypothyroidism        Relevant Orders    TSH    Mixed hyperlipidemia        Relevant Orders    Lipid panel    Screening for malignant neoplasm of prostate        Relevant Orders    PSA Screen    Dizziness        Relevant Medications    meclizine (ANTIVERT) 25 MG tablet    Other Relevant Orders    Ambulatory Referral to Physical Therapy Evaluate and treat, Vestibular        New Medications Ordered This Visit   Medications   • meclizine (ANTIVERT) 25 MG tablet     Sig: Take 1 tablet by mouth 3 (Three) Times a Day As Needed for Dizziness.     Dispense:  90 tablet     Refill:  1     1.  Annual physical exam:  Continue on current medications as previously prescribed   I spent 29 minutes in direct face to face contact with patient.  Greater than 50% of  this time was spent counseling patient and discussing plan of care.  Return in about 3 months (around 2/19/2020).    2.  Type 2 diabetes mellitus with hyperglycemia:  Complete CBC, chemistry panel, hemoglobin A1c and microalbumin/creatinine urine ratio as ordered and will notify results when available  Ophthalmology will call to schedule diabetic eye exam  Encouraged to adhere to ADA diet  Continue on Glucophage as previously prescribed  Educated on importance of thorough diabetic foot care and encouraged to inspect feet daily    3.  Mixed hyperlipidemia:  Complete fasting lipid panel as ordered and will notify results when available  Continue on Lipitor as previously prescribed  Encouraged to adhere to low-fat diet  Encouraged to increase physical activity regimen such as walking to promote cardiovascular health    4.  Dizziness:  Begin Antivert as prescribed  Educated on possible sedative side effects of this medication  Referral placed to physical therapy for vestibular therapy and will call to schedule appointment  Encouraged to change positions slowly in order to reduce fall risk  Encouraged to increase fluids to promote hydration    5.  Screening for malignant neoplasm of the prostate:  Complete PSA as ordered and will notify results when available    6.  Screening for colon cancer:  Referral placed for screening colonoscopy and will call to schedule appointment    7.  Screening for hypothyroidism:  Complete TSH as ordered and will notify results when available        This document has been electronically signed by TWIN Benavidez on November 20, 2019 7:29 AM

## 2019-11-21 ENCOUNTER — HOSPITAL ENCOUNTER (OUTPATIENT)
Dept: PHYSICAL THERAPY | Facility: HOSPITAL | Age: 49
Setting detail: THERAPIES SERIES
Discharge: HOME | End: 2019-11-21

## 2019-11-21 DIAGNOSIS — R42 DIZZINESS: Primary | ICD-10-CM

## 2019-11-21 PROCEDURE — 97161 PT EVAL LOW COMPLEX 20 MIN: CPT | Performed by: PHYSICAL THERAPIST

## 2019-11-22 NOTE — THERAPY EVALUATION
Outpatient Physical Therapy Vestibular Initial Evaluation  [facility]     Patient Name: Jak Valle  : 1970  MRN: [id number]  Today's Date: 2019      Visit Date: 2019  Attendance:  (authorization required)  Subjective Improvement: n/a  Next MD Appt: 20  Recert Date: 19    Therapy Diagnosis: Vestibulopathy/dizziness         Past Medical History:   Diagnosis Date   • Adopted    • Amblyopia    • Diabetes mellitus type 2 in obese (CMS/HCC)    • Dyslipidemia    • Exotropia    • GERD (gastroesophageal reflux disease)    • Hypertensive disorder    • Major depressive disorder     RECURRENT, UNSPECIFIED   • Major depressive disorder, recurrent (CMS/HCC)    • Microalbuminuria    • Myopia     right eye   • Obesity    • Type 2 diabetes mellitus with hyperglycemia (CMS/HCC)         Past Surgical History:   Procedure Laterality Date   • ADENOIDECTOMY     • EAR TUBES     • TONSILLECTOMY     • UMBILICAL HERNIA REPAIR  2014    Open umbilical hernia repair. Excision of 2.5 cm sebaceous cyst of back with 5 cm intermediate closure.       Current Outpatient Medications:   •  aspirin 81 MG chewable tablet, Chew 81 mg Daily., Disp: , Rfl:   •  atorvastatin (LIPITOR) 40 MG tablet, Take 1 tablet by mouth every night at bedtime., Disp: 30 tablet, Rfl: 5  •  magnesium oxide (MAGOX 400) 400 (241.3 Mg) MG tablet tablet, Take 2 tablets by mouth Daily. (Patient taking differently: Take 400 mg by mouth Daily.), Disp: 60 each, Rfl: 3  •  meclizine (ANTIVERT) 25 MG tablet, Take 1 tablet by mouth 3 (Three) Times a Day As Needed for Dizziness., Disp: 90 tablet, Rfl: 1  •  metFORMIN (GLUCOPHAGE) 1000 MG tablet, Take 1 tablet by mouth 2 (Two) Times a Day With Meals., Disp: 60 tablet, Rfl: 3    Allergies   Allergen Reactions   • Januvia [Sitagliptin] Itching and Rash       Visit Dx:     ICD-10-CM ICD-9-CM   1. Dizziness R42 780.4       Patient History     Row Name 19 0800             History  "   Chief Complaint  Dizziness  -SS      Date Current Problem(s) Began  -- \"Off and on for a while\"; 1 yr  -SS      Brief Description of Current Complaint  Patient has a chronic history of occasional dizziness. 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 Dizziness at that time make him nauseated. Dizziness and balance have been worse since that episode. He has not had episodes of nausea since the incident in 2018, but he notes that he may need to sit down or bumps into walls while walking. No dizziness transferring from sitting to supine and moving around in bed that he notices. Occasional dizziness with supine to sit and sit to stand transfers. More dizzy when straightens up after being bent over. Doesn't really feel dizzy while out shopping or in a crowd. Does have some dizziness while riding in a car but not driving. Doesn't notice dizziness with objects moving across visual field such as windshield wipers or traffic/trains. Dizziness will be better on its own. Duration is variable.  male with children.   -      Patient/Caregiver Goals  Relief from dizziness  -SS      Current Tobacco Use  cigarette smoker  -      Smoking Status  <1 ppd  -SS      Patient's Rating of General Health  Fair  -SS      Occupation/sports/leisure activities  Unemployed. Hobbies: photography, music, paranormal investigating  -      What clinical tests have you had for this problem?  -- none recent  -         Pain     Difficulties at work?  n/a  -SS      Difficulties with ADL's?  limited by dizziness  -SS      Difficulties with recreational activities?  limited by dizziness \"sometimes\"  -         Fall Risk Assessment    Any falls in the past year:  No has stumbled/lost balance due to dizziness  -SS      Does patient have a fear of falling  Yes (comment)  -         Daily Activities    Primary Language  English  -      Pt Participated in POC and Goals  Yes  -         Safety    " Are you being hurt, hit, or frightened by anyone at home or in your life?  No  -SS      Are you being neglected by a caregiver  No  -SS        User Key  (r) = Recorded By, (t) = Taken By, (c) = Cosigned By    Initials Name Provider Type    SS Sky Montilla PT DPT Physical Therapist          Vestibular Lanette     Row Name 11/21/19 0900             Subjective Comments    Subjective Comments  see Therapy Patient History  -SS         Pain Assessment    Pain Assessment  No/denies pain  -SS         Vestibular Objective    General Observation  Patient presents in no acute distress. L eye laterally deviates at times.   -SS      Fall History  none in past 12 months; has experienced instances of stumbles and losses of balance due to dizziness  -SS      Use of Assistive Devices  none  -SS         Cognition    Orientation Level  Oriented to place;Oriented to time;Oriented to situation;Oriented to person  -SS         Symptom Behavior    Type of Dizziness  Lightheadedness;Imbalance  -SS      Frequency of Dizziness  Once a day;2-3 time a week  -SS      Duration of Dizziness  Seconds;Minutes  -SS      VAS Intensity (0-10)  -- 1/10 currently; 6/10 worst x 1 month  -SS      Aggravating Factors  Activity in general straightening up after being bent over; sudden movements  -SS      Relieving Factors  Rest  -SS         Oculomotor Exam Fixation Present    Ocular ROM  Normal  -SS      Spontaneous Nystagmus  Absent  -SS      Gaze-induced Nystagmus  Absent  -SS      Head Shaking Horizontal  Absent  -SS      Head Shaking Vertical  Absent  -SS      Smooth Pursuit  Saccadic R eye saccadic with horizontal when moving R to L  -SS      Saccades  Overshoots  -SS      Convergence  Abnormal symptom reproduction  -SS         Vestibulo-Ocular Reflex (VOR)    VOR 1 Head Only  normal  -SS      VOR 2 Head and Object  --  -SS      VOR Cancellation  -- symptom reproduction  -SS        User Key  (r) = Recorded By, (t) = Taken By, (c) = Cosigned By     Initials Name Provider Type     Sky Montilla, PT DPT Physical Therapist        Therapy Education  Education Details: VOR cancellation  Given: HEP  How Provided: Verbal, Demonstration, Written  Provided to: Patient  Level of Understanding: Verbalized, Demonstrated        PT OP Goals     Row Name 11/21/19 0800          PT Short Term Goals    STG Date to Achieve  12/12/19  -SS     STG 1  Note a >/= 50% subjective improvement.  -SS     STG 2  Dizziness Handicap Inventory score to be </= 36.  -SS        Long Term Goals    LTG Date to Achieve  01/02/19  -SS     LTG 1  Independent with HEP/self-management.  -SS     LTG 2  Minimal dizziness with activity.  -SS     LTG 3  Dizziness Handicap Inventory score to be </= 20.  -        Time Calculation    PT Goal Re-Cert Due Date  12/12/19  -       User Key  (r) = Recorded By, (t) = Taken By, (c) = Cosigned By    Initials Name Provider Type     Sky Montilla, PT DPT Physical Therapist          PT Assessment/Plan     Row Name 11/21/19 0800          PT Assessment    Functional Limitations  Limitations in functional capacity and performance  -     Impairments  Balance dizziness  -     Assessment Comments  Patient has dizziness and imbalance that may be related to vestibulo-ocular reflex and visual tracking issues. Does not appear to be related to BPPV at this time.   -     Rehab Potential  -- Good/fair. Barrier: chronicity  -     Patient/caregiver participated in establishment of treatment plan and goals  Yes  -SS     Patient would benefit from skilled therapy intervention  Yes  -SS        PT Plan    PT Frequency  1x/week;2x/week  -     Predicted Duration of Therapy Intervention (Therapy Eval)  4-6 weeks  -     Planned CPT's?  PT EVAL MOD COMPLEXITY: 04767;PT THER ACT EA 15 MIN: 32470;PT THER PROC EA 15 MIN: 67253;PT THER SUPP EA 15 MIN  -SS     PT Plan Comments  Vestibular rehabilitation including, but not limited to, habituation and  adaptation exercises.  -       User Key  (r) = Recorded By, (t) = Taken By, (c) = Cosigned By    Initials Name Provider Type     Sky Montilla, PT DPT Physical Therapist           Outcome Measure Options: Dizziness Handicap Inventory(Total: 52)         Time Calculation:   Start Time: 0845  Stop Time: 0929  Time Calculation (min): 44 min   Therapy Charges for Today     Code Description Service Date Service Provider Modifiers Qty    14873600725  PT EVAL LOW COMPLEXITY 3 11/21/2019 Sky Montilla, PT DPT GP 1                   Sky Montilla, PT, DPT, CHT  11/21/2019

## 2019-11-27 ENCOUNTER — HOSPITAL ENCOUNTER (OUTPATIENT)
Facility: HOSPITAL | Age: 49
Setting detail: HOSPITAL OUTPATIENT SURGERY
End: 2019-11-27
Attending: SURGERY | Admitting: SURGERY

## 2019-11-27 ENCOUNTER — PREP FOR SURGERY (OUTPATIENT)
Dept: OTHER | Facility: HOSPITAL | Age: 49
End: 2019-11-27

## 2019-11-27 DIAGNOSIS — Z12.11 SCREEN FOR COLON CANCER: Primary | ICD-10-CM

## 2019-11-27 RX ORDER — DEXTROSE AND SODIUM CHLORIDE 5; .45 G/100ML; G/100ML
100 INJECTION, SOLUTION INTRAVENOUS CONTINUOUS PRN
Status: CANCELLED | OUTPATIENT
Start: 2019-12-13

## 2019-12-03 ENCOUNTER — HOSPITAL ENCOUNTER (OUTPATIENT)
Dept: PHYSICAL THERAPY | Facility: HOSPITAL | Age: 49
Setting detail: THERAPIES SERIES
Discharge: HOME | End: 2019-12-03

## 2019-12-03 DIAGNOSIS — R42 DIZZINESS: Primary | ICD-10-CM

## 2019-12-03 PROCEDURE — 97110 THERAPEUTIC EXERCISES: CPT

## 2019-12-03 NOTE — THERAPY TREATMENT NOTE
Outpatient Physical Therapy Ortho Treatment Note  [facility]     Patient Name: Jak Valle  : 1970  MRN: [id number]  Today's Date: 12/3/2019      Visit Date: 2019    Subjective Improvement 0  Visits 2/2  Visits approved 6 from 2019 to 2019  RTMD 2019  Recert Date 2019    Vestibulopathy/dizziness    Visit Dx:    ICD-10-CM ICD-9-CM   1. Dizziness R42 780.4       Patient Active Problem List   Diagnosis   • Type 2 diabetes mellitus with hyperglycemia (CMS/HCC)   • Obesity   • Myopia   • Microalbuminuria   • Hypertensive disorder   • GERD (gastroesophageal reflux disease)   • Exotropia   • Dyslipidemia   • Amblyopia   • Plantar wart of left foot   • Insomnia   • Body mass index 35.0-35.9, adult   • Smokes tobacco daily   • Difficulty concentrating   • Dizziness of unknown cause   • Screen for colon cancer        Past Medical History:   Diagnosis Date   • Adopted    • Amblyopia    • Diabetes mellitus type 2 in obese (CMS/HCC)    • Dyslipidemia    • Exotropia    • GERD (gastroesophageal reflux disease)    • Hypertensive disorder    • Major depressive disorder     RECURRENT, UNSPECIFIED   • Major depressive disorder, recurrent (CMS/HCC)    • Microalbuminuria    • Myopia     right eye   • Obesity    • Type 2 diabetes mellitus with hyperglycemia (CMS/HCC)         Past Surgical History:   Procedure Laterality Date   • ADENOIDECTOMY     • EAR TUBES     • TONSILLECTOMY     • UMBILICAL HERNIA REPAIR  2014    Open umbilical hernia repair. Excision of 2.5 cm sebaceous cyst of back with 5 cm intermediate closure.                       PT Assessment/Plan     Row Name 19 1705          PT Assessment    Assessment Comments  no major change after eply.  appears to be more visual tracking issue  -CP        PT Plan    PT Frequency  1x/week;2x/week  -CP     Predicted Duration of Therapy Intervention (Therapy Eval)  4-6 weeks  -CP     PT Plan Comments  Cont with POC.  monitor  "response to HEP  -CP       User Key  (r) = Recorded By, (t) = Taken By, (c) = Cosigned By    Initials Name Provider Type    Elisa Hernandez PTA Physical Therapy Assistant            OP Exercises     Row Name 12/03/19 1600             Subjective Comments    Subjective Comments  Patient states that he is not too dizzy today.  He reports that he has a left lazy eye.    -CP         Subjective Pain    Able to rate subjective pain?  yes dizzy  -CP      Pre-Treatment Pain Level  1  -CP      Post-Treatment Pain Level  1  -CP         Exercise 1    Exercise Name 1  eply right  -CP         Exercise 2    Exercise Name 2  review HEP  -CP         Exercise 3    Exercise Name 3  gaze stab  -CP      Cueing 3  Verbal;Demo  -CP      Sets 3  up and down and side ways  -CP      Time 3  max 30\"   -CP      Additional Comments  60 bpm  -CP         Exercise 4    Exercise Name 4  cawthorne cooksey  -CP      Cueing 4  Verbal;Demo  -CP      Time 4  focusing on abject and moving toward face  -CP         Exercise 5    Exercise Name 5  sit to stand with 360 turns  -CP      Cueing 5  Verbal;Demo  -CP      Reps 5  2  -CP      Time 5  bilateral  -CP         Exercise 6    Exercise Name 6  reading colored letters  -CP         Exercise 7    Exercise Name 7  reading colors of words  -CP         Exercise 8    Exercise Name 8  eply right  -CP         Exercise 9    Exercise Name 9  quiet rest  -CP      Time 9  15  -CP        User Key  (r) = Recorded By, (t) = Taken By, (c) = Cosigned By    Initials Name Provider Type    Elisa Hernandez PTA Physical Therapy Assistant                       PT OP Goals     Row Name 12/03/19 1700          PT Short Term Goals    STG Date to Achieve  12/12/19  -CP     STG 1  Note a >/= 50% subjective improvement.  -CP     STG 1 Progress  Not Met  -CP     STG 2  Dizziness Handicap Inventory score to be </= 36.  -CP     STG 2 Progress  Not Met  -CP        Long Term Goals    LTG Date to Achieve  01/02/19  -CP     LTG 1  " Independent with HEP/self-management.  -CP     LTG 1 Progress  Ongoing  -CP     LTG 2  Minimal dizziness with activity.  -CP     LTG 2 Progress  Not Met  -CP     LTG 3  Dizziness Handicap Inventory score to be </= 20.  -CP     LTG 3 Progress  Not Met  -CP        Time Calculation    PT Goal Re-Cert Due Date  12/12/19  -CP       User Key  (r) = Recorded By, (t) = Taken By, (c) = Cosigned By    Initials Name Provider Type    CP Elisa Alexander PTA Physical Therapy Assistant          Therapy Education  Education Details: readying colored words and reading color of words.  gaze stab  Given: HEP  Program: New  How Provided: Verbal, Demonstration, Written  Provided to: Patient  Level of Understanding: Teach back education performed, Verbalized, Demonstrated              Time Calculation:   Start Time: 1345  Stop Time: 1445  Time Calculation (min): 60 min  Total Timed Code Minutes- PT: 39 minute(s)  Therapy Charges for Today     Code Description Service Date Service Provider Modifiers Qty    07129192279 HC PT THER SUPP EA 15 MIN 12/3/2019 Elisa Alexander PTA GP 1    60947300019 HC PT THER PROC EA 15 MIN 12/3/2019 Elisa Alexander PTA GP 3                    Elisa Alexander PTA  12/3/2019

## 2019-12-09 ENCOUNTER — APPOINTMENT (OUTPATIENT)
Dept: PHYSICAL THERAPY | Facility: HOSPITAL | Age: 49
End: 2019-12-09

## 2019-12-26 ENCOUNTER — TELEPHONE (OUTPATIENT)
Dept: FAMILY MEDICINE CLINIC | Facility: CLINIC | Age: 49
End: 2019-12-26

## 2019-12-26 NOTE — TELEPHONE ENCOUNTER
Information has been reayed to the patient that we need the name, location ans phone number of the pharmacy he wants it sent to.    ----- Message from Jak Valle sent at 12/24/2019 12:40 PM CST -----  Regarding: Prescription Question  Contact: [phone]  Can you all send a refill for metformin to a [facility] in [place], [place]?

## 2019-12-27 ENCOUNTER — TELEPHONE (OUTPATIENT)
Dept: FAMILY MEDICINE CLINIC | Facility: CLINIC | Age: 49
End: 2019-12-27

## 2019-12-27 DIAGNOSIS — E11.65 TYPE 2 DIABETES MELLITUS WITH HYPERGLYCEMIA, WITHOUT LONG-TERM CURRENT USE OF INSULIN (HCC): ICD-10-CM

## 2019-12-27 NOTE — TELEPHONE ENCOUNTER
Jak called and needs a refill for Metformin sent to [facility] at [address] in [place] [id number]    [facility] ph# [phone]

## 2020-01-06 DIAGNOSIS — L72.0 EPIDERMAL CYST OF FACE: Primary | ICD-10-CM

## 2020-05-31 DIAGNOSIS — E11.65 TYPE 2 DIABETES MELLITUS WITH HYPERGLYCEMIA, WITHOUT LONG-TERM CURRENT USE OF INSULIN (HCC): ICD-10-CM

## 2020-05-31 DIAGNOSIS — E78.2 ELEVATED CHOLESTEROL WITH ELEVATED TRIGLYCERIDES: ICD-10-CM

## 2020-05-31 DIAGNOSIS — E83.42 HYPOMAGNESEMIA: ICD-10-CM

## 2020-06-01 RX ORDER — ATORVASTATIN CALCIUM 40 MG/1
40 TABLET, FILM COATED ORAL
Qty: 30 TABLET | Refills: 5 | Status: SHIPPED | OUTPATIENT
Start: 2020-06-01 | End: 2021-09-02 | Stop reason: SDUPTHER

## 2020-06-02 DIAGNOSIS — E83.42 HYPOMAGNESEMIA: ICD-10-CM

## 2020-06-11 ENCOUNTER — OFFICE VISIT (OUTPATIENT)
Dept: FAMILY MEDICINE CLINIC | Facility: CLINIC | Age: 50
End: 2020-06-11

## 2020-06-11 ENCOUNTER — RESULTS ENCOUNTER (OUTPATIENT)
Dept: FAMILY MEDICINE CLINIC | Facility: CLINIC | Age: 50
End: 2020-06-11

## 2020-06-11 VITALS
BODY MASS INDEX: 31.94 KG/M2 | DIASTOLIC BLOOD PRESSURE: 76 MMHG | HEIGHT: 72 IN | SYSTOLIC BLOOD PRESSURE: 126 MMHG | WEIGHT: 235.8 LBS

## 2020-06-11 DIAGNOSIS — E11.65 TYPE 2 DIABETES MELLITUS WITH HYPERGLYCEMIA, WITHOUT LONG-TERM CURRENT USE OF INSULIN (HCC): Primary | ICD-10-CM

## 2020-06-11 DIAGNOSIS — E78.00 HYPERCHOLESTEROLEMIA: ICD-10-CM

## 2020-06-11 DIAGNOSIS — Z11.59 NEED FOR HEPATITIS C SCREENING TEST: ICD-10-CM

## 2020-06-11 DIAGNOSIS — Z12.11 SCREENING FOR COLON CANCER: ICD-10-CM

## 2020-06-11 DIAGNOSIS — Z13.220 SCREENING FOR HYPERCHOLESTEROLEMIA: ICD-10-CM

## 2020-06-11 PROBLEM — F17.200 SMOKES TOBACCO DAILY: Status: RESOLVED | Noted: 2017-01-10 | Resolved: 2020-06-11

## 2020-06-11 PROCEDURE — 99214 OFFICE O/P EST MOD 30 MIN: CPT | Performed by: NURSE PRACTITIONER

## 2020-06-11 RX ORDER — BLOOD-GLUCOSE METER
1 KIT MISCELLANEOUS DAILY
Qty: 1 EACH | Refills: 0 | Status: SHIPPED | OUTPATIENT
Start: 2020-06-11 | End: 2020-07-16

## 2020-06-11 NOTE — PROGRESS NOTES
Subjective   Jak Valle is a 49 y.o. male.  6-month follow-up for diabetes and high cholesterol.  Has not taken his metformin in the last several days.  Has not been checking his sugars at home.    Diabetes   He presents for his initial diabetic visit. He has type 2 diabetes mellitus. No MedicAlert identification noted. The initial diagnosis of diabetes was made 3 years ago. His disease course has been stable. Hypoglycemia symptoms include nervousness/anxiousness, sleepiness and tremors. Pertinent negatives for hypoglycemia include no hunger, mood changes, pallor, seizures or sweats. Associated symptoms include polydipsia and polyuria. Pertinent negatives for diabetes include no polyphagia. Pertinent negatives for hypoglycemia complications include no blackouts, no hospitalization, no nocturnal hypoglycemia, no required assistance and no required glucagon injection. Symptoms are improving. Diabetic complications include PVD. Pertinent negatives for diabetic complications include no CVA, heart disease, nephropathy, peripheral neuropathy or retinopathy. Risk factors for coronary artery disease include dyslipidemia, hypertension, obesity, sedentary lifestyle, stress and tobacco exposure. Current diabetic treatment includes diet and oral agent (monotherapy). He is compliant with treatment most of the time. His weight is fluctuating minimally. He is following a generally healthy diet. Meal planning includes avoidance of concentrated sweets. He has not had a previous visit with a dietitian. He rarely participates in exercise. He monitors blood glucose at home 1-2 x per day. Blood glucose monitoring compliance is adequate. His home blood glucose trend is fluctuating minimally. His breakfast blood glucose is taken between 8-9 am. His breakfast blood glucose range is generally 110-130 mg/dl. His dinner blood glucose is taken between 7-8 pm. His dinner blood glucose range is generally 140-180 mg/dl. His overall blood  glucose range is 180-200 mg/dl.   Hyperlipidemia   This is a chronic problem. The current episode started more than 1 year ago. The problem is controlled. Exacerbating diseases include obesity. Factors aggravating his hyperlipidemia include fatty foods and smoking. Pertinent negatives include no shortness of breath. Current antihyperlipidemic treatment includes statins. The current treatment provides mild improvement of lipids. Compliance problems include adherence to diet and adherence to exercise.  Risk factors for coronary artery disease include diabetes mellitus, dyslipidemia, family history, male sex, obesity and a sedentary lifestyle.        The following portions of the patient's history were reviewed and updated as appropriate:   Current Outpatient Medications   Medication Sig Dispense Refill   • aspirin 81 MG chewable tablet Chew 81 mg Daily.     • atorvastatin (LIPITOR) 40 MG tablet Take 1 tablet by mouth every night at bedtime. 30 tablet 5   • magnesium oxide (MagOx 400) 400 (241.3 Mg) MG tablet tablet Take 2 tablets by mouth Daily. 60 each 3   • meclizine (ANTIVERT) 25 MG tablet Take 1 tablet by mouth 3 (Three) Times a Day As Needed for Dizziness. 90 tablet 1   • metFORMIN (GLUCOPHAGE) 1000 MG tablet Take 1 tablet by mouth 2 (Two) Times a Day With Meals. 180 tablet 1   • glucose blood test strip Use as instructed 100 each 3   • glucose monitor monitoring kit 1 each Daily. 1 each 0     No current facility-administered medications for this visit.      Current Outpatient Medications on File Prior to Visit   Medication Sig   • aspirin 81 MG chewable tablet Chew 81 mg Daily.   • atorvastatin (LIPITOR) 40 MG tablet Take 1 tablet by mouth every night at bedtime.   • magnesium oxide (MagOx 400) 400 (241.3 Mg) MG tablet tablet Take 2 tablets by mouth Daily.   • meclizine (ANTIVERT) 25 MG tablet Take 1 tablet by mouth 3 (Three) Times a Day As Needed for Dizziness.   • [DISCONTINUED] metFORMIN (GLUCOPHAGE) 1000 MG  "tablet Take 1 tablet by mouth 2 (Two) Times a Day With Meals.     No current facility-administered medications on file prior to visit.      He is allergic to januvia [sitagliptin]..    Review of Systems   Constitutional: Negative.    HENT: Negative.  Negative for sore throat.    Eyes: Negative.    Respiratory: Negative.  Negative for cough and shortness of breath.    Cardiovascular: Negative.    Gastrointestinal: Negative.    Endocrine: Positive for polydipsia and polyuria. Negative for polyphagia.   Musculoskeletal: Negative.    Skin: Negative.  Negative for pallor.   Neurological: Positive for tremors. Negative for seizures.   Psychiatric/Behavioral: The patient is nervous/anxious.        Objective    Visit Vitals  /76   Ht 182.9 cm (72\")   Wt 107 kg (235 lb 12.8 oz)   BMI 31.98 kg/m²       Physical Exam   Constitutional: He is oriented to person, place, and time. He appears well-developed and well-nourished.   HENT:   Head: Normocephalic.   Right Ear: External ear normal.   Left Ear: External ear normal.   Eyes: Pupils are equal, round, and reactive to light. EOM are normal.   Neck: Normal range of motion. Neck supple.   Cardiovascular: Normal rate, regular rhythm and normal heart sounds.   Pulmonary/Chest: Effort normal and breath sounds normal.   Abdominal: Soft. Bowel sounds are normal.   Musculoskeletal: Normal range of motion.   Neurological: He is alert and oriented to person, place, and time.   Skin: Skin is warm. Capillary refill takes less than 2 seconds.   Psychiatric: He has a normal mood and affect. His behavior is normal.   Nursing note and vitals reviewed.      Assessment/Plan   Problems Addressed this Visit        Endocrine    Type 2 diabetes mellitus with hyperglycemia (CMS/[facility]) - Primary    Relevant Medications    glucose blood test strip    glucose monitor monitoring kit    metFORMIN (GLUCOPHAGE) 1000 MG tablet    Other Relevant Orders    Microalbumin / Creatinine Urine Ratio - Urine, " Clean Catch      Other Visit Diagnoses     Hypercholesterolemia        Need for hepatitis C screening test        Relevant Orders    Hepatitis C antibody    Screening for colon cancer        Relevant Orders    Cologuard - Stool, Per Rectum        New Medications Ordered This Visit   Medications   • glucose blood test strip     Sig: Use as instructed     Dispense:  100 each     Refill:  3   • glucose monitor monitoring kit     Si each Daily.     Dispense:  1 each     Refill:  0     Please run through insurance   • metFORMIN (GLUCOPHAGE) 1000 MG tablet     Sig: Take 1 tablet by mouth 2 (Two) Times a Day With Meals.     Dispense:  180 tablet     Refill:  1     Generic For:*GLUCOPHAGE 1000MG     1.  Type 2 diabetes mellitus with hyperglycemia:  Refill prescription for metformin sent to pharmacy and educated on importance of taking this medication daily as prescribed  New prescription for glucometer with test strips and lancets sent to pharmacy and encouraged to check fingerstick blood sugar at least once per day  Complete microalbumin/creatinine urine ratio as ordered and will notify results when available  Educated on importance of thorough diabetic foot care and encouraged to inspect feet daily for ulcerations or calluses  Strongly encouraged adhere to ADA diet    2.  Hypercholesterolemia:  Continue on Lipitor as previously prescribed  Encouraged adhere to low-fat diet  Discussed ways to reduce saturated fats in diet such as choosing lean meat like chicken and fish as opposed to fatty red meat  Encouraged baking or air frying foods as opposed to deep frying or pan frying them    3.  Screening for colon cancer:  Order placed for screening colonoscopy and will call to schedule appointment    4.  Need for hepatitis C screening test:  Complete hepatitis C antibody as ordered and will notify results when available    Continue on current medications as previously prescribed   I spent 27 minutes in direct face to face  contact with patient.  Greater than 50% of this time was spent counseling patient and discussing plan of care.  Return in about 6 months (around 12/11/2020) for Annual physical.        This document has been electronically signed by TWIN Benavidez on June 12, 2020 12:41

## 2020-07-16 DIAGNOSIS — E11.65 TYPE 2 DIABETES MELLITUS WITH HYPERGLYCEMIA, WITHOUT LONG-TERM CURRENT USE OF INSULIN (HCC): ICD-10-CM

## 2020-07-16 RX ORDER — BLOOD-GLUCOSE METER
EACH MISCELLANEOUS
Qty: 1 KIT | Refills: 0 | Status: SHIPPED | OUTPATIENT
Start: 2020-07-16 | End: 2023-03-06

## 2020-09-08 ENCOUNTER — OFFICE VISIT (OUTPATIENT)
Dept: SURGERY | Facility: CLINIC | Age: 50
End: 2020-09-08

## 2020-09-08 ENCOUNTER — HOSPITAL ENCOUNTER (OUTPATIENT)
Facility: HOSPITAL | Age: 50
Setting detail: HOSPITAL OUTPATIENT SURGERY
End: 2020-09-08
Attending: SURGERY | Admitting: SURGERY

## 2020-09-08 VITALS
HEART RATE: 104 BPM | TEMPERATURE: 97.2 F | DIASTOLIC BLOOD PRESSURE: 72 MMHG | SYSTOLIC BLOOD PRESSURE: 128 MMHG | WEIGHT: 229.6 LBS | HEIGHT: 72 IN | BODY MASS INDEX: 31.1 KG/M2

## 2020-09-08 DIAGNOSIS — L02.212 CUTANEOUS ABSCESS OF BACK EXCLUDING BUTTOCKS: Primary | ICD-10-CM

## 2020-09-08 PROCEDURE — 99204 OFFICE O/P NEW MOD 45 MIN: CPT | Performed by: SURGERY

## 2020-09-08 RX ORDER — SODIUM CHLORIDE 0.9 % (FLUSH) 0.9 %
10 SYRINGE (ML) INJECTION AS NEEDED
Status: CANCELLED | OUTPATIENT
Start: 2020-09-17

## 2020-09-08 RX ORDER — SODIUM CHLORIDE 0.9 % (FLUSH) 0.9 %
3 SYRINGE (ML) INJECTION EVERY 12 HOURS SCHEDULED
Status: CANCELLED | OUTPATIENT
Start: 2020-09-17

## 2020-09-08 RX ORDER — CEFAZOLIN SODIUM IN 0.9 % NACL 3 G/100 ML
3 INTRAVENOUS SOLUTION, PIGGYBACK (ML) INTRAVENOUS ONCE
Status: CANCELLED | OUTPATIENT
Start: 2020-09-17 | End: 2020-09-08

## 2020-09-08 NOTE — PROGRESS NOTES
Subjective   Jak Valle is a 50 y.o. male     Chief Complaint: Infection in gluteal cleft    History of Present Illness presents with a 1 to 2-week history of an infection right edge of his gluteal cleft.  Patient said it started draining a few days ago and feels better and is gotten smaller than it originally was.  Patient's had a sebaceous or cyst removed from his back in the distant past.  He is also had pilonidal disease and had it removed on 2 separate occasions the last of which is been 15 to 20 years ago.  Patient is currently unemployed.  He says that pain was mainly from the swelling in the infection and it got significantly improved with spontaneously draining and feels much better currently.    Review of Systems   Constitutional: Negative.    HENT: Negative.    Eyes: Negative.    Respiratory: Negative.    Cardiovascular: Negative.    Gastrointestinal: Negative.    Endocrine: Negative.    Genitourinary: Negative.    Musculoskeletal: Negative.    Skin: Negative.    Allergic/Immunologic: Negative.    Neurological: Negative.    Hematological: Negative.    Psychiatric/Behavioral: Negative.      Past Medical History:   Diagnosis Date   • Adopted    • Amblyopia    • Diabetes mellitus type 2 in obese (CMS/HCC)    • Dyslipidemia    • Exotropia    • GERD (gastroesophageal reflux disease)    • Hypertensive disorder    • Major depressive disorder     RECURRENT, UNSPECIFIED   • Major depressive disorder, recurrent (CMS/HCC)    • Microalbuminuria    • Myopia     right eye   • Obesity    • Type 2 diabetes mellitus with hyperglycemia (CMS/HCC)      Past Surgical History:   Procedure Laterality Date   • ADENOIDECTOMY     • EAR TUBES     • TONSILLECTOMY     • UMBILICAL HERNIA REPAIR  04/01/2014    Open umbilical hernia repair. Excision of 2.5 cm sebaceous cyst of back with 5 cm intermediate closure.     Family History   Adopted: Yes   Problem Relation Age of Onset   • No Known Problems Mother      Social History      Socioeconomic History   • Marital status:      Spouse name: Not on file   • Number of children: Not on file   • Years of education: Not on file   • Highest education level: Not on file   Tobacco Use   • Smoking status: Current Some Day Smoker     Types: Cigarettes   • Smokeless tobacco: Current User     Types: Snuff   • Tobacco comment: 1 pk/wk presently; 1 pk a day x more than 10 yrs   Substance and Sexual Activity   • Alcohol use: No   • Drug use: No   • Sexual activity: Defer     Allergies   Allergen Reactions   • Januvia [Sitagliptin] Itching and Rash     Vitals:    09/08/20 0851   BP: 128/72   Pulse: 104   Temp: 97.2 °F (36.2 °C)       Home Medications:  Prior to Admission medications    Medication Sig Start Date End Date Taking? Authorizing Provider   aspirin 81 MG chewable tablet Chew 81 mg Daily.   Yes Provider, MD Bhakti   atorvastatin (LIPITOR) 40 MG tablet Take 1 tablet by mouth every night at bedtime. 6/1/20  Yes Brunilda Suggs APRN   Blood Glucose Monitoring Suppl (ONETOUCH VERIO) w/Device kit AS DIRECTED 7/16/20  Yes Brunilda Suggs APRN   doxycycline (MONODOX) 100 MG capsule Take 1 capsule by mouth 2 (Two) Times a Day for 10 days. 8/31/20 9/10/20 Yes Eva Jimenez APRN   glucose blood test strip Use as instructed 6/11/20  Yes Brunilda Suggs APRN   magnesium oxide (MagOx 400) 400 (241.3 Mg) MG tablet tablet Take 2 tablets by mouth Daily. 6/2/20  Yes Brunilda uSggs APRN   metFORMIN (GLUCOPHAGE) 1000 MG tablet Take 1 tablet by mouth 2 (Two) Times a Day With Meals. 6/12/20  Yes Brunilda Suggs APRN   meclizine (ANTIVERT) 25 MG tablet Take 1 tablet by mouth 3 (Three) Times a Day As Needed for Dizziness. 11/19/19   Brunilda Suggs APRN       Objective   Physical Exam   Constitutional: He is oriented to person, place, and time. He appears well-developed and well-nourished. No distress.   HENT:   Head: Normocephalic and atraumatic.   Eyes: Conjunctivae and EOM are normal. No scleral icterus.    Neck: Normal range of motion. Neck supple. No tracheal deviation present. No thyromegaly present.   Cardiovascular: Normal rate, regular rhythm and normal heart sounds. Exam reveals no gallop and no friction rub.   No murmur heard.  Pulmonary/Chest: Effort normal and breath sounds normal. No stridor. No respiratory distress. He has no wheezes. He has no rales. He exhibits no tenderness.   Abdominal: Soft. Bowel sounds are normal. He exhibits no distension and no mass. There is no tenderness. There is no rebound and no guarding. No hernia.   Musculoskeletal: Normal range of motion. He exhibits no edema or deformity.   Lymphadenopathy:     He has no cervical adenopathy.   Neurological: He is alert and oriented to person, place, and time.   Skin: Skin is warm and dry. No rash noted. No cyanosis or erythema. No pallor. Nails show no clubbing.   Psychiatric: He has a normal mood and affect. His behavior is normal. Thought content normal.   4 x 4 centimeter erythematous indurated area to the right of midline midportion of gluteal cleft.  Scar on the midline gluteal cleft barely able to be appreciated and this is lateral to that on the right side.  No obvious pits or sinuses appreciated.    Assessment/Plan Abscess right sided gluteal cleft may be recurrent pilonidal disease or may be a primary skin issue such as a sebaceous cyst.  Clearly was significantly infected would recommend excision.  Other option would be to try to debride this and leave it open.  Think it can be removed and either close primarily or rotate a flap probable rhomboid flap for closure.  If we rotate a flap we will place a drain in place.  I fully discussed excision with possible rhomboid flap with patient.  He clearly understands his options as well as risk and benefits and wishes to proceed      The encounter diagnosis was Cutaneous abscess of back excluding buttocks.                     This document has been electronically signed by Jose GARDUNO  MD Didi on September 8, 2020 10:46

## 2020-09-08 NOTE — PATIENT INSTRUCTIONS

## 2020-09-14 ENCOUNTER — LAB (OUTPATIENT)
Dept: LAB | Facility: HOSPITAL | Age: 50
End: 2020-09-14

## 2020-09-14 ENCOUNTER — APPOINTMENT (OUTPATIENT)
Dept: PREADMISSION TESTING | Facility: HOSPITAL | Age: 50
End: 2020-09-14

## 2020-09-14 VITALS
DIASTOLIC BLOOD PRESSURE: 78 MMHG | HEIGHT: 72 IN | OXYGEN SATURATION: 98 % | BODY MASS INDEX: 31.69 KG/M2 | WEIGHT: 234 LBS | HEART RATE: 88 BPM | RESPIRATION RATE: 18 BRPM | SYSTOLIC BLOOD PRESSURE: 142 MMHG

## 2020-09-14 LAB
ANION GAP SERPL CALCULATED.3IONS-SCNC: 12 MMOL/L (ref 5–15)
BUN SERPL-MCNC: 15 MG/DL (ref 6–20)
BUN/CREAT SERPL: 16.3 (ref 7–25)
CALCIUM SPEC-SCNC: 9.4 MG/DL (ref 8.6–10.5)
CHLORIDE SERPL-SCNC: 100 MMOL/L (ref 98–107)
CO2 SERPL-SCNC: 23 MMOL/L (ref 22–29)
CREAT SERPL-MCNC: 0.92 MG/DL (ref 0.76–1.27)
GFR SERPL CREATININE-BSD FRML MDRD: 86 ML/MIN/1.73
GLUCOSE SERPL-MCNC: 358 MG/DL (ref 65–99)
POTASSIUM SERPL-SCNC: 4.4 MMOL/L (ref 3.5–5.2)
SODIUM SERPL-SCNC: 135 MMOL/L (ref 136–145)

## 2020-09-14 PROCEDURE — 36415 COLL VENOUS BLD VENIPUNCTURE: CPT

## 2020-09-14 PROCEDURE — 80048 BASIC METABOLIC PNL TOTAL CA: CPT | Performed by: SURGERY

## 2020-09-14 PROCEDURE — C9803 HOPD COVID-19 SPEC COLLECT: HCPCS | Performed by: SURGERY

## 2020-09-14 PROCEDURE — 93010 ELECTROCARDIOGRAM REPORT: CPT | Performed by: INTERNAL MEDICINE

## 2020-09-14 PROCEDURE — 93005 ELECTROCARDIOGRAM TRACING: CPT

## 2020-09-14 PROCEDURE — U0003 INFECTIOUS AGENT DETECTION BY NUCLEIC ACID (DNA OR RNA); SEVERE ACUTE RESPIRATORY SYNDROME CORONAVIRUS 2 (SARS-COV-2) (CORONAVIRUS DISEASE [COVID-19]), AMPLIFIED PROBE TECHNIQUE, MAKING USE OF HIGH THROUGHPUT TECHNOLOGIES AS DESCRIBED BY CMS-2020-01-R: HCPCS | Performed by: SURGERY

## 2020-09-14 RX ORDER — SODIUM CHLORIDE 9 MG/ML
1000 INJECTION, SOLUTION INTRAVENOUS CONTINUOUS
Status: CANCELLED | OUTPATIENT
Start: 2020-09-17

## 2020-09-15 LAB
COVID LABCORP PRIORITY: NORMAL
SARS-COV-2 RNA RESP QL NAA+PROBE: NOT DETECTED

## 2020-10-26 ENCOUNTER — DOCUMENTATION (OUTPATIENT)
Dept: PHYSICAL THERAPY | Facility: HOSPITAL | Age: 50
End: 2020-10-26

## 2020-10-26 DIAGNOSIS — R42 DIZZINESS: Primary | ICD-10-CM

## 2021-09-02 ENCOUNTER — TELEPHONE (OUTPATIENT)
Dept: FAMILY MEDICINE CLINIC | Facility: CLINIC | Age: 51
End: 2021-09-02

## 2021-09-02 ENCOUNTER — OFFICE VISIT (OUTPATIENT)
Dept: FAMILY MEDICINE CLINIC | Facility: CLINIC | Age: 51
End: 2021-09-02

## 2021-09-02 VITALS
DIASTOLIC BLOOD PRESSURE: 74 MMHG | HEART RATE: 76 BPM | SYSTOLIC BLOOD PRESSURE: 116 MMHG | BODY MASS INDEX: 30.26 KG/M2 | WEIGHT: 223.4 LBS | HEIGHT: 72 IN | OXYGEN SATURATION: 97 %

## 2021-09-02 DIAGNOSIS — E78.00 HYPERCHOLESTEROLEMIA: ICD-10-CM

## 2021-09-02 DIAGNOSIS — E11.65 TYPE 2 DIABETES MELLITUS WITH HYPERGLYCEMIA, WITHOUT LONG-TERM CURRENT USE OF INSULIN (HCC): ICD-10-CM

## 2021-09-02 DIAGNOSIS — E83.42 HYPOMAGNESEMIA: ICD-10-CM

## 2021-09-02 DIAGNOSIS — Z13.29 SCREENING FOR HYPOTHYROIDISM: ICD-10-CM

## 2021-09-02 DIAGNOSIS — Z11.59 NEED FOR HEPATITIS C SCREENING TEST: ICD-10-CM

## 2021-09-02 DIAGNOSIS — I10 ESSENTIAL HYPERTENSION: ICD-10-CM

## 2021-09-02 DIAGNOSIS — E78.2 ELEVATED CHOLESTEROL WITH ELEVATED TRIGLYCERIDES: ICD-10-CM

## 2021-09-02 DIAGNOSIS — Z00.00 ANNUAL PHYSICAL EXAM: Primary | ICD-10-CM

## 2021-09-02 DIAGNOSIS — Z12.5 SCREENING FOR MALIGNANT NEOPLASM OF PROSTATE: ICD-10-CM

## 2021-09-02 PROCEDURE — 99396 PREV VISIT EST AGE 40-64: CPT | Performed by: NURSE PRACTITIONER

## 2021-09-02 RX ORDER — ZINC GLUCONATE 50 MG
TABLET ORAL DAILY
COMMUNITY
End: 2023-03-06

## 2021-09-02 RX ORDER — MELATONIN
1000 DAILY
COMMUNITY
End: 2023-03-06

## 2021-09-02 RX ORDER — MULTIVIT-MIN/IRON/FOLIC ACID/K 18-600-40
CAPSULE ORAL DAILY
COMMUNITY
End: 2023-03-06

## 2021-09-02 RX ORDER — ATORVASTATIN CALCIUM 40 MG/1
40 TABLET, FILM COATED ORAL
Qty: 90 TABLET | Refills: 1 | Status: SHIPPED | OUTPATIENT
Start: 2021-09-02 | End: 2023-03-06

## 2021-09-02 NOTE — PROGRESS NOTES
"Chief Complaint  Annual Exam and Diabetes    Subjective  patient states \"he has had a sore throat that's been going on for a month that has not resolved.\" Onset of symptoms reported after swimming in [facility].        Jak Valle presents to [facility] [place]  Annual Physical Exam  Diabetes  He presents for his initial diabetic visit. He has type 2 diabetes mellitus. No MedicAlert identification noted. The initial diagnosis of diabetes was made 3 years ago. His disease course has been stable. Hypoglycemia symptoms include nervousness/anxiousness, sleepiness and tremors. Pertinent negatives for hypoglycemia include no hunger, mood changes, pallor, seizures or sweats. Associated symptoms include polydipsia and polyuria. Pertinent negatives for diabetes include no polyphagia. Pertinent negatives for hypoglycemia complications include no blackouts, no hospitalization, no nocturnal hypoglycemia, no required assistance and no required glucagon injection. Symptoms are improving. Diabetic complications include PVD. Pertinent negatives for diabetic complications include no CVA, heart disease, nephropathy, peripheral neuropathy or retinopathy. Risk factors for coronary artery disease include dyslipidemia, hypertension, obesity, sedentary lifestyle, stress and tobacco exposure. Current diabetic treatment includes diet and oral agent (monotherapy). He is compliant with treatment most of the time. His weight is fluctuating minimally. He is following a generally healthy diet. Meal planning includes avoidance of concentrated sweets. He has not had a previous visit with a dietitian. He rarely participates in exercise. He monitors blood glucose at home 1-2 x per day. Blood glucose monitoring compliance is adequate. His home blood glucose trend is fluctuating minimally. His breakfast blood glucose is taken between 8-9 am. His breakfast blood glucose range is generally " "110-130 mg/dl. His dinner blood glucose is taken between 7-8 pm. His dinner blood glucose range is generally 140-180 mg/dl. His overall blood glucose range is 180-200 mg/dl.   Hyperlipidemia  This is a chronic problem. The current episode started more than 1 year ago. The problem is controlled. Exacerbating diseases include obesity. Factors aggravating his hyperlipidemia include fatty foods and smoking. Pertinent negatives include no shortness of breath. Current antihyperlipidemic treatment includes statins. The current treatment provides mild improvement of lipids. Compliance problems include adherence to diet and adherence to exercise.  Risk factors for coronary artery disease include diabetes mellitus, dyslipidemia, family history, male sex, obesity and a sedentary lifestyle.   Hypertension  This is a chronic problem. The current episode started more than 1 year ago. The problem has been resolved since onset. Pertinent negatives include no shortness of breath or sweats. Hypertensive end-organ damage includes PVD. There is no history of CVA or retinopathy.       Objective   Vital Signs:   /74   Pulse 76   Ht 182.9 cm (72\")   Wt 101 kg (223 lb 6.4 oz)   SpO2 97%   BMI 30.30 kg/m²     Physical Exam  Vitals and nursing note reviewed.   Constitutional:       Appearance: Normal appearance. He is well-developed.   HENT:      Head: Normocephalic and atraumatic.      Comments: Cobblestoning pattern with the amount of clear drainage to the posterior pharynx     Right Ear: Tympanic membrane, ear canal and external ear normal.      Left Ear: Tympanic membrane, ear canal and external ear normal.      Nose: Nose normal.      Mouth/Throat:      Mouth: Mucous membranes are moist.      Pharynx: Oropharynx is clear.   Eyes:      Pupils: Pupils are equal, round, and reactive to light.   Cardiovascular:      Rate and Rhythm: Normal rate and regular rhythm.      Heart sounds: Normal heart sounds.   Pulmonary:      Effort: " Pulmonary effort is normal.      Breath sounds: Normal breath sounds.   Abdominal:      General: Bowel sounds are normal.      Palpations: Abdomen is soft.   Musculoskeletal:         General: Normal range of motion.      Cervical back: Normal range of motion and neck supple.   Feet:      Right foot:      Protective Sensation: 10 sites tested. 10 sites sensed.      Skin integrity: Dry skin present.      Toenail Condition: Right toenails are normal.      Left foot:      Protective Sensation: 10 sites tested. 10 sites sensed.      Skin integrity: Dry skin present.      Toenail Condition: Left toenails are normal.      Comments: Diabetic foot exam:   Left: Filament test present   Pulses Dorsalis Pedis:  present   Reflexes 2+    Vibratory sensation normal   Proprioception normal   Sharp/dull discrimination normal       Right: Filament test present   Pulses Dorsalis Pedis:  present   Reflexes 2+    Vibratory sensation normal   Proprioception normal   Sharp/dull discrimination normal  Skin:     General: Skin is warm.      Capillary Refill: Capillary refill takes less than 2 seconds.   Neurological:      Mental Status: He is alert and oriented to person, place, and time.   Psychiatric:         Behavior: Behavior normal.        Result Review :     Common labs    Common Labsle 9/14/20   Glucose 358 (A)   BUN 15   Creatinine 0.92   eGFR Non African Am 86   Sodium 135 (A)   Potassium 4.4   Chloride 100   Calcium 9.4   (A) Abnormal value                      Assessment and Plan    Diagnoses and all orders for this visit:    1. Annual physical exam (Primary)    2. Essential hypertension  -     CBC & Differential; Future  -     Comprehensive Metabolic Panel; Future    3. Type 2 diabetes mellitus with hyperglycemia, without long-term current use of insulin (CMS/[facility])  -     Ambulatory Referral for Diabetic Eye Exam-Ophthalmology  -     Hemoglobin A1c; Future  -     Microalbumin / Creatinine Urine Ratio - Urine, Clean Catch;  Future    4. Hypercholesterolemia  -     Lipid Panel; Future    5. Need for hepatitis C screening test  -     Hepatitis C Antibody; Future    6. Screening for malignant neoplasm of prostate  -     PSA SCREENING; Future    7. Screening for hypothyroidism  -     TSH; Future    1. Annual physical exam  Educated on importance of adhering to a low fat diet and exercise on improving overall physical and mental health.     2. Essential hypertension  Remains normotensive   Continue to adhere to a low sodium diet.    3.Type 2 diabetes mellitus with hyperglycemia, without long-term current use of insulin  Continue taking metformin as previously prescribed.   Referral placed to ophthalmology    4. Hypercholesterolemia   Continue to adhere to a low fat diet.   Continue taking Lipitor as previously prescribed.  Complete fasting Lipid panel as ordered will notify when results are available.     5. Need for hepatitis C screening test   Complete Hepatitis C screening as ordered will notify when results are available     6.Screening for malignant neoplasm of prostate  Complete PSA screening as ordered will notify when results are available     7. Screening for hypothyroidism  Complete fasting TSH lab as ordered will notify when results are available    I spent 35 minutes caring for Jak on this date of service. This time includes time spent by me in the following activities:preparing for the visit, reviewing tests, obtaining and/or reviewing a separately obtained history, performing a medically appropriate examination and/or evaluation , counseling and educating the patient/family/caregiver, ordering medications, tests, or procedures and documenting information in the medical record  Follow Up   Return in about 1 year (around 9/2/2022) for Annual physical.  Patient was given instructions and counseling regarding his condition or for health maintenance advice. Please see specific information pulled into the AVS if appropriate.          This document has been electronically signed by TWIN Benavidez on September 2, 2021 12:21 CDT

## 2021-09-02 NOTE — TELEPHONE ENCOUNTER
----- Message from Jak Valle sent at 9/2/2021  4:23 PM CDT -----  Regarding: Prescription Question  Contact: [phone]  Where did my scrips get sent? [facility] said they don't have them.

## 2021-10-01 ENCOUNTER — TELEPHONE (OUTPATIENT)
Dept: FAMILY MEDICINE CLINIC | Facility: CLINIC | Age: 51
End: 2021-10-01

## 2021-10-01 NOTE — TELEPHONE ENCOUNTER
Holly from [facility] called and said they never received the referral for Mr. Valle. She wants to know if it could be faxed again please.   Thanks     Fax# [phone]

## 2023-03-06 ENCOUNTER — OFFICE VISIT (OUTPATIENT)
Dept: FAMILY MEDICINE CLINIC | Facility: CLINIC | Age: 53
End: 2023-03-06
Payer: COMMERCIAL

## 2023-03-06 VITALS
SYSTOLIC BLOOD PRESSURE: 148 MMHG | OXYGEN SATURATION: 98 % | HEART RATE: 102 BPM | HEIGHT: 72 IN | DIASTOLIC BLOOD PRESSURE: 82 MMHG | BODY MASS INDEX: 30.37 KG/M2 | WEIGHT: 224.2 LBS

## 2023-03-06 DIAGNOSIS — Z11.59 NEED FOR HEPATITIS C SCREENING TEST: ICD-10-CM

## 2023-03-06 DIAGNOSIS — R51.9 LEFT FACIAL PRESSURE AND PAIN: ICD-10-CM

## 2023-03-06 DIAGNOSIS — Z13.29 SCREENING FOR HYPOTHYROIDISM: ICD-10-CM

## 2023-03-06 DIAGNOSIS — I10 ESSENTIAL HYPERTENSION: ICD-10-CM

## 2023-03-06 DIAGNOSIS — Z12.5 SCREENING FOR MALIGNANT NEOPLASM OF PROSTATE: ICD-10-CM

## 2023-03-06 DIAGNOSIS — E11.65 TYPE 2 DIABETES MELLITUS WITH HYPERGLYCEMIA, WITHOUT LONG-TERM CURRENT USE OF INSULIN: ICD-10-CM

## 2023-03-06 DIAGNOSIS — E78.00 HYPERCHOLESTEROLEMIA: ICD-10-CM

## 2023-03-06 DIAGNOSIS — Z00.00 ANNUAL PHYSICAL EXAM: Primary | ICD-10-CM

## 2023-03-06 DIAGNOSIS — E66.09 CLASS 1 OBESITY DUE TO EXCESS CALORIES WITH SERIOUS COMORBIDITY AND BODY MASS INDEX (BMI) OF 30.0 TO 30.9 IN ADULT: ICD-10-CM

## 2023-03-06 DIAGNOSIS — K21.9 GASTROESOPHAGEAL REFLUX DISEASE WITHOUT ESOPHAGITIS: ICD-10-CM

## 2023-03-06 PROBLEM — E11.9 DIABETES TYPE 2, CONTROLLED (HCC): Status: ACTIVE | Noted: 2021-09-15

## 2023-03-06 PROCEDURE — 99214 OFFICE O/P EST MOD 30 MIN: CPT | Performed by: NURSE PRACTITIONER

## 2023-03-06 RX ORDER — LOSARTAN POTASSIUM 25 MG/1
25 TABLET ORAL DAILY
Qty: 30 TABLET | Refills: 5 | Status: SHIPPED | OUTPATIENT
Start: 2023-03-06

## 2023-03-06 NOTE — PROGRESS NOTES
"Chief Complaint  Annual Exam    Subjective   Annual physical exam.  Has not been seen in this office in almost 2 years.  \"I stopped taking all the medications because I just want to start fresh.  About a month ago the last time I was sick I had these episodes where I had these flashes that would start at the top of my head and go down.  Also is my vision is blurry but I just saw the eye doctor regularly he said my eyes look great.\"        Jak Valle presents to [facility] [place]  History of Present Illness  Annual Physical Exam  Diabetes  He presents for his initial diabetic visit. He has type 2 diabetes mellitus. No MedicAlert identification noted. The initial diagnosis of diabetes was made 3 years ago. His disease course has been worsening. Hypoglycemia symptoms include dizziness, headaches, nervousness/anxiousness, sleepiness and tremors. Pertinent negatives for hypoglycemia include no confusion, hunger, mood changes, pallor, seizures or sweats. Associated symptoms include polydipsia, polyuria and visual change. Pertinent negatives for diabetes include no polyphagia. Pertinent negatives for hypoglycemia complications include no blackouts, no hospitalization, no nocturnal hypoglycemia, no required assistance and no required glucagon injection. Symptoms are worsening. Diabetic complications include peripheral neuropathy and PVD. Pertinent negatives for diabetic complications include no CVA, heart disease, nephropathy or retinopathy. Risk factors for coronary artery disease include dyslipidemia, hypertension, obesity, sedentary lifestyle, stress and tobacco exposure. When asked about current treatments, none were reported. He is compliant with treatment none of the time. His weight is fluctuating minimally. He is following a generally healthy diet. Meal planning includes avoidance of concentrated sweets. He has not had a previous visit with a dietitian. He " rarely participates in exercise. There is no compliance with monitoring of blood glucose. His breakfast blood glucose is taken between 8-9 am. His breakfast blood glucose range is generally 110-130 mg/dl. His dinner blood glucose is taken between 7-8 pm. His dinner blood glucose range is generally 140-180 mg/dl. His overall blood glucose range is 180-200 mg/dl. An ACE inhibitor/angiotensin II receptor blocker is not being taken.   Hyperlipidemia  This is a chronic problem. The current episode started more than 1 year ago. Exacerbating diseases include obesity. Factors aggravating his hyperlipidemia include fatty foods and smoking. Associated symptoms include a focal sensory loss. Pertinent negatives include no shortness of breath. The current treatment provides mild improvement of lipids. Compliance problems include adherence to diet and adherence to exercise.  Risk factors for coronary artery disease include diabetes mellitus, dyslipidemia, family history, male sex, obesity and a sedentary lifestyle.   Hypertension  This is a chronic problem. The current episode started more than 1 year ago. The problem has been rapidly worsening since onset. The problem is uncontrolled. Associated symptoms include headaches and malaise/fatigue. Pertinent negatives include no shortness of breath or sweats. Current antihypertension treatment includes nothing. Compliance problems include exercise and diet.  Hypertensive end-organ damage includes PVD. There is no history of CVA or retinopathy.   Obesity  This is a chronic problem. The current episode started more than 1 year ago. The problem occurs constantly. The problem has been unchanged. Associated symptoms include headaches and a visual change. The symptoms are aggravated by eating and drinking. He has tried nothing for the symptoms. The treatment provided no relief.   Heartburn  He complains of belching, heartburn and water brash. This is a chronic problem. The current episode  started more than 1 year ago. The problem occurs occasionally. The problem has been waxing and waning. The heartburn does not wake him from sleep. The heartburn does not limit his activity. The heartburn doesn't change with position. The symptoms are aggravated by certain foods. Risk factors include caffeine use, lack of exercise, smoking/tobacco exposure and obesity.   Neurologic Problem  The patient's primary symptoms include focal sensory loss and a visual change. This is a new problem. The current episode started 1 to 4 weeks ago. The neurological problem developed suddenly. The problem has been waxing and waning since onset. There was left-sided focality noted. Associated symptoms include dizziness and headaches. Pertinent negatives include no bladder incontinence, bowel incontinence, confusion or shortness of breath. Past treatments include bed rest. The treatment provided mild relief. There is no history of mood changes.     Current Outpatient Medications on File Prior to Visit   Medication Sig Dispense Refill   • aspirin 81 MG chewable tablet Chew 1 tablet Daily.     • [DISCONTINUED] Ascorbic Acid (Vitamin C) 500 MG capsule Take  by mouth Daily.     • [DISCONTINUED] atorvastatin (LIPITOR) 40 MG tablet Take 1 tablet by mouth every night at bedtime. 90 tablet 1   • [DISCONTINUED] Blood Glucose Monitoring Suppl (ONETOUCH VERIO) w/Device kit AS DIRECTED 1 kit 0   • [DISCONTINUED] cholecalciferol (VITAMIN D3) 25 MCG (1000 UT) tablet Take 1,000 Units by mouth Daily.     • [DISCONTINUED] glucose blood test strip Use as instructed 100 each 3   • [DISCONTINUED] magnesium oxide (MagOx 400) 400 (241.3 Mg) MG tablet tablet Take 2 tablets by mouth Daily. 180 each 1   • [DISCONTINUED] meclizine (ANTIVERT) 25 MG tablet Take 1 tablet by mouth 3 (Three) Times a Day As Needed for Dizziness. 90 tablet 1   • [DISCONTINUED] metFORMIN (GLUCOPHAGE) 1000 MG tablet Take 1 tablet by mouth Daily With Breakfast. 90 tablet 1   •  "[DISCONTINUED] Zinc 50 MG tablet Take  by mouth Daily.       No current facility-administered medications on file prior to visit.     Allergies   Allergen Reactions   • Januvia [Sitagliptin] Itching and Rash       Objective   Vital Signs:  /82   Pulse 102   Ht 182.9 cm (72\")   Wt 102 kg (224 lb 3.2 oz)   SpO2 98%   BMI 30.41 kg/m²   Estimated body mass index is 30.41 kg/m² as calculated from the following:    Height as of this encounter: 182.9 cm (72\").    Weight as of this encounter: 102 kg (224 lb 3.2 oz).       Physical Exam  Vitals and nursing note reviewed.   Constitutional:       Appearance: Normal appearance. He is well-developed. He is obese.   HENT:      Head: Normocephalic and atraumatic.      Right Ear: Tympanic membrane, ear canal and external ear normal.      Left Ear: Tympanic membrane, ear canal and external ear normal.      Nose: Nose normal.      Mouth/Throat:      Mouth: Mucous membranes are moist.      Pharynx: Oropharynx is clear.   Eyes:      Extraocular Movements: Extraocular movements intact.      Conjunctiva/sclera: Conjunctivae normal.      Pupils: Pupils are equal, round, and reactive to light.   Cardiovascular:      Rate and Rhythm: Normal rate and regular rhythm.      Pulses: Normal pulses.      Heart sounds: Normal heart sounds.   Pulmonary:      Effort: Pulmonary effort is normal.      Breath sounds: Normal breath sounds.   Abdominal:      General: Bowel sounds are normal.      Palpations: Abdomen is soft.   Musculoskeletal:         General: Normal range of motion.      Cervical back: Normal range of motion and neck supple.   Skin:     General: Skin is warm.      Capillary Refill: Capillary refill takes less than 2 seconds.   Neurological:      Mental Status: He is alert and oriented to person, place, and time.   Psychiatric:         Behavior: Behavior normal.        Result Review :                   Assessment and Plan   Diagnoses and all orders for this visit:    1. Annual " physical exam (Primary)    2. Essential hypertension  -     CBC & Differential; Future  -     Comprehensive Metabolic Panel; Future  -     losartan (COZAAR) 25 MG tablet; Take 1 tablet by mouth Daily.  Dispense: 30 tablet; Refill: 5    3. Hypercholesterolemia  -     Lipid Panel; Future    4. Type 2 diabetes mellitus with hyperglycemia, without long-term current use of insulin (HCC)  -     Hemoglobin A1c; Future  -     Microalbumin / Creatinine Urine Ratio - Urine, Clean Catch; Future    5. Gastroesophageal reflux disease without esophagitis    6. Class 1 obesity due to excess calories with serious comorbidity and body mass index (BMI) of 30.0 to 30.9 in adult    7. Left facial pressure and pain  -     MRI Brain With & Without Contrast; Future    8. Need for hepatitis C screening test  -     Hepatitis C Antibody; Future    9. Screening for hypothyroidism  -     TSH; Future    10. Screening for malignant neoplasm of prostate  -     PSA Screen; Future      1.  Annual physical exam:  Continue on current medications as previously prescribed   Counseling on importance of heathy eating habits and regular physical activity regimen on improving overall physical and mental health.     2.  Essential hypertension:  Hypertensive  Complete CBC and chemistry panel as ordered will notify of results when available  Begin losartan as prescribed  Encouraged to check blood pressure 3-4 times per week and bring log of readings to next appointment  Reduce sodium intake to no more than 1500 mg/day  Target systolic BP of below 140    3.  Hypercholesterolemia:  Complete fasting lipid panel as ordered  Bake, steam or air hidalgo foods to reduce saturated fats in diet    4.  Type 2 diabetes mellitus with hyperglycemia without long-term current use of insulin:  Discussed possibility of starting new oral medication as well as possible GLP-1 pending outcome of lab results    5.  Gastroesophageal reflux disease without esophagitis:  May use  over-the-counter antacids such as Tums or Rolaids according to package direction    6.  Class I obesity due to excess calories with serious comorbidity and a body mass index of 30.0-30.9 in adult:  Body mass index is 30.41 kg/m².  BMI is >= 30 and <35. (Class 1 Obesity). The following options were offered after discussion;: exercise counseling/recommendations and nutrition counseling/recommendations     7.  Left facial pressure and pain:  Radiology will call to schedule MRI of the brain will notify of results when available  Seek emergency medical treatment for any new or worsening signs or symptoms of impending stroke such as left-sided facial weakness, facial drooping or slurred speech    8  Need for hepatitis C screening test:  Complete hepatitis C antibody as ordered and will notify results when available    9.  Screening for hypothyroidism:  Complete TSH as ordered and noted    10.  Screening for malignant neoplasm of prostate:  Complete PSA as ordered and will notify of results when available       Follow Up   Return in about 4 weeks (around 4/3/2023) for Recheck high blood pressure and diabetes .  Patient was given instructions and counseling regarding his condition or for health maintenance advice. Please see specific information pulled into the AVS if appropriate.         This document has been electronically signed by TWIN Benavidez on March 6, 2023 15:59 CST

## 2023-03-08 ENCOUNTER — LAB (OUTPATIENT)
Dept: LAB | Facility: HOSPITAL | Age: 53
End: 2023-03-08
Payer: COMMERCIAL

## 2023-03-08 DIAGNOSIS — Z13.29 SCREENING FOR HYPOTHYROIDISM: ICD-10-CM

## 2023-03-08 DIAGNOSIS — I10 ESSENTIAL HYPERTENSION: ICD-10-CM

## 2023-03-08 DIAGNOSIS — Z11.59 NEED FOR HEPATITIS C SCREENING TEST: ICD-10-CM

## 2023-03-08 DIAGNOSIS — E78.00 HYPERCHOLESTEROLEMIA: ICD-10-CM

## 2023-03-08 DIAGNOSIS — E11.65 TYPE 2 DIABETES MELLITUS WITH HYPERGLYCEMIA, WITHOUT LONG-TERM CURRENT USE OF INSULIN: ICD-10-CM

## 2023-03-08 DIAGNOSIS — Z12.5 SCREENING FOR MALIGNANT NEOPLASM OF PROSTATE: ICD-10-CM

## 2023-03-08 LAB
ALBUMIN SERPL-MCNC: 4.2 G/DL (ref 3.5–5.2)
ALBUMIN/GLOB SERPL: 1.4 G/DL
ALP SERPL-CCNC: 90 U/L (ref 39–117)
ALT SERPL W P-5'-P-CCNC: 27 U/L (ref 1–41)
ANION GAP SERPL CALCULATED.3IONS-SCNC: 8.3 MMOL/L (ref 5–15)
AST SERPL-CCNC: 18 U/L (ref 1–40)
BASOPHILS # BLD AUTO: 0.1 10*3/MM3 (ref 0–0.2)
BASOPHILS NFR BLD AUTO: 0.9 % (ref 0–1.5)
BILIRUB SERPL-MCNC: 0.2 MG/DL (ref 0–1.2)
BUN SERPL-MCNC: 13 MG/DL (ref 6–20)
BUN/CREAT SERPL: 13.1 (ref 7–25)
CALCIUM SPEC-SCNC: 10.2 MG/DL (ref 8.6–10.5)
CHLORIDE SERPL-SCNC: 101 MMOL/L (ref 98–107)
CHOLEST SERPL-MCNC: 226 MG/DL (ref 0–200)
CO2 SERPL-SCNC: 26.7 MMOL/L (ref 22–29)
CREAT SERPL-MCNC: 0.99 MG/DL (ref 0.76–1.27)
DEPRECATED RDW RBC AUTO: 38.8 FL (ref 37–54)
EGFRCR SERPLBLD CKD-EPI 2021: 90.5 ML/MIN/1.73
EOSINOPHIL # BLD AUTO: 0.51 10*3/MM3 (ref 0–0.4)
EOSINOPHIL NFR BLD AUTO: 4.7 % (ref 0.3–6.2)
ERYTHROCYTE [DISTWIDTH] IN BLOOD BY AUTOMATED COUNT: 12.8 % (ref 12.3–15.4)
GLOBULIN UR ELPH-MCNC: 3.1 GM/DL
GLUCOSE SERPL-MCNC: 377 MG/DL (ref 65–99)
HCT VFR BLD AUTO: 45.8 % (ref 37.5–51)
HCV AB SER DONR QL: NORMAL
HDLC SERPL-MCNC: 36 MG/DL (ref 40–60)
HGB BLD-MCNC: 15.6 G/DL (ref 13–17.7)
IMM GRANULOCYTES # BLD AUTO: 0.03 10*3/MM3 (ref 0–0.05)
IMM GRANULOCYTES NFR BLD AUTO: 0.3 % (ref 0–0.5)
LDLC SERPL CALC-MCNC: 162 MG/DL (ref 0–100)
LDLC/HDLC SERPL: 4.44 {RATIO}
LYMPHOCYTES # BLD AUTO: 3.53 10*3/MM3 (ref 0.7–3.1)
LYMPHOCYTES NFR BLD AUTO: 32.7 % (ref 19.6–45.3)
MCH RBC QN AUTO: 29.3 PG (ref 26.6–33)
MCHC RBC AUTO-ENTMCNC: 34.1 G/DL (ref 31.5–35.7)
MCV RBC AUTO: 85.9 FL (ref 79–97)
MONOCYTES # BLD AUTO: 0.93 10*3/MM3 (ref 0.1–0.9)
MONOCYTES NFR BLD AUTO: 8.6 % (ref 5–12)
NEUTROPHILS NFR BLD AUTO: 5.71 10*3/MM3 (ref 1.7–7)
NEUTROPHILS NFR BLD AUTO: 52.8 % (ref 42.7–76)
NRBC BLD AUTO-RTO: 0 /100 WBC (ref 0–0.2)
PLATELET # BLD AUTO: 311 10*3/MM3 (ref 140–450)
PMV BLD AUTO: 11.5 FL (ref 6–12)
POTASSIUM SERPL-SCNC: 5.3 MMOL/L (ref 3.5–5.2)
PROT SERPL-MCNC: 7.3 G/DL (ref 6–8.5)
PSA SERPL-MCNC: 0.85 NG/ML (ref 0–4)
RBC # BLD AUTO: 5.33 10*6/MM3 (ref 4.14–5.8)
SODIUM SERPL-SCNC: 136 MMOL/L (ref 136–145)
TRIGL SERPL-MCNC: 150 MG/DL (ref 0–150)
TSH SERPL DL<=0.05 MIU/L-ACNC: 1.76 UIU/ML (ref 0.27–4.2)
VLDLC SERPL-MCNC: 28 MG/DL (ref 5–40)
WBC NRBC COR # BLD: 10.81 10*3/MM3 (ref 3.4–10.8)

## 2023-03-08 PROCEDURE — 82043 UR ALBUMIN QUANTITATIVE: CPT

## 2023-03-08 PROCEDURE — 82570 ASSAY OF URINE CREATININE: CPT

## 2023-03-08 PROCEDURE — G0103 PSA SCREENING: HCPCS

## 2023-03-08 PROCEDURE — 36415 COLL VENOUS BLD VENIPUNCTURE: CPT

## 2023-03-08 PROCEDURE — 80061 LIPID PANEL: CPT

## 2023-03-08 PROCEDURE — 80050 GENERAL HEALTH PANEL: CPT

## 2023-03-08 PROCEDURE — 83036 HEMOGLOBIN GLYCOSYLATED A1C: CPT

## 2023-03-08 PROCEDURE — 86803 HEPATITIS C AB TEST: CPT

## 2023-03-09 ENCOUNTER — TELEPHONE (OUTPATIENT)
Dept: FAMILY MEDICINE CLINIC | Facility: CLINIC | Age: 53
End: 2023-03-09
Payer: COMMERCIAL

## 2023-03-09 LAB
ALBUMIN UR-MCNC: 6.2 MG/DL
CREAT UR-MCNC: 100.5 MG/DL
HBA1C MFR BLD: 11.89 % (ref 4.8–5.6)
MICROALBUMIN/CREAT UR: 61.7 MG/G

## 2023-03-09 RX ORDER — ATORVASTATIN CALCIUM 10 MG/1
10 TABLET, FILM COATED ORAL NIGHTLY
Qty: 30 TABLET | Refills: 2 | Status: SHIPPED | OUTPATIENT
Start: 2023-03-09

## 2023-03-09 RX ORDER — ORAL SEMAGLUTIDE 3 MG/1
3 TABLET ORAL DAILY
Qty: 30 TABLET | Refills: 2 | Status: SHIPPED | OUTPATIENT
Start: 2023-03-09 | End: 2023-04-06 | Stop reason: SDUPTHER

## 2023-03-09 NOTE — PROGRESS NOTES
Per TWIN Worley, Mr. Valle  has been called with recent lab results & recommendations.  Continue current medications and follow-up as planned or sooner if any problems.     He said she does not want injectables for his blood sugars.   He said he will try the oral meds. He really does not want to go on a statin but will try.   He said it seems like you are robbing Peter to pay Sagar. I ask what did he mean by that.   He said it sounds like one problem is bad but the medication is worse.   I did tell him that going on the medications did not mean that he could not possible come off if he takes his diet and exercise seriously.  I did tell him that it will take hard work & discipline.      He uses [facility]

## 2023-03-09 NOTE — TELEPHONE ENCOUNTER
Per TWIN Worley, Mr. Valle  has been called with recent lab results & recommendations.  Continue current medications and follow-up as planned or sooner if any problems.     He said she does not want injectables for his blood sugars.   He said he will try the oral meds. He really does not want to go on a statin but will try.   He said it seems like you are robbing Peter to pay Sagar. I ask what did he mean by that.   He said it sounds like one problem is bad but the medication is worse.   I did tell him that going on the medications did not mean that he could not possible come off if he takes his diet and exercise seriously.  I did tell him that it will take hard work & discipline.      He uses [facility]        ----- Message from TWIN Benavidez sent at 3/9/2023  6:28 AM CST -----  Fasting glucose was elevated at 377 and his hemoglobin A1c is up to 11.89.  That is the highest it has been in over 6 years.  He needs to start back on diabetic medications.  As discussed in office is he willing to start an oral medication and an injectable or what he preferred to oral medications?  He needs to reduce concentrated sweets and carbohydrates in his diet and increase low-impact activities such as exercising.  Slight elevation in potassium level.  He needs to reduce potassium rich foods in his diet.  Total cholesterol, good cholesterol and bad cholesterol are all abnormal.  I really need to start him back on a statin to help reduce his risk of heart attack or stroke due to the fact that he is also diabetic.  He needs to reduce saturated fats in his diet.  Slight elevation in white blood cell count but has improved and is the lowest that has been in almost 8 years.  All other labs were essentially normal.

## 2023-03-09 NOTE — TELEPHONE ENCOUNTER
Per TWIN Worley, Mr. Valle has been called with new medication information & recommendations.  Continue current medications and follow-up as planned or sooner if any problems.       ----- Message from TWIN Benavidez sent at 3/9/2023  2:43 PM CST -----  I have sent in a prescription for Rybelsus and Jardiance to his pharmacy.  These will help to target his hemoglobin A1c which is uncontrolled.  I have sent in a low-dose Lipitor that he will take nightly to help try and reduce cholesterol levels.

## 2023-03-09 NOTE — PROGRESS NOTES
Per TWIN Worley, Mr. Valle has been called with new medication information & recommendations.  Continue current medications and follow-up as planned or sooner if any problems.

## 2023-03-10 DIAGNOSIS — E11.65 TYPE 2 DIABETES MELLITUS WITH HYPERGLYCEMIA, WITHOUT LONG-TERM CURRENT USE OF INSULIN: Primary | ICD-10-CM

## 2023-04-06 ENCOUNTER — OFFICE VISIT (OUTPATIENT)
Dept: FAMILY MEDICINE CLINIC | Facility: CLINIC | Age: 53
End: 2023-04-06
Payer: COMMERCIAL

## 2023-04-06 VITALS
WEIGHT: 217.7 LBS | HEART RATE: 95 BPM | OXYGEN SATURATION: 98 % | SYSTOLIC BLOOD PRESSURE: 132 MMHG | HEIGHT: 72 IN | DIASTOLIC BLOOD PRESSURE: 78 MMHG | BODY MASS INDEX: 29.49 KG/M2

## 2023-04-06 DIAGNOSIS — E11.65 TYPE 2 DIABETES MELLITUS WITH HYPERGLYCEMIA, WITHOUT LONG-TERM CURRENT USE OF INSULIN: ICD-10-CM

## 2023-04-06 DIAGNOSIS — E78.00 HYPERCHOLESTEROLEMIA: ICD-10-CM

## 2023-04-06 DIAGNOSIS — I10 PRIMARY HYPERTENSION: Primary | ICD-10-CM

## 2023-04-06 PROCEDURE — 3078F DIAST BP <80 MM HG: CPT | Performed by: NURSE PRACTITIONER

## 2023-04-06 PROCEDURE — 99213 OFFICE O/P EST LOW 20 MIN: CPT | Performed by: NURSE PRACTITIONER

## 2023-04-06 PROCEDURE — 3046F HEMOGLOBIN A1C LEVEL >9.0%: CPT | Performed by: NURSE PRACTITIONER

## 2023-04-06 PROCEDURE — 1159F MED LIST DOCD IN RCRD: CPT | Performed by: NURSE PRACTITIONER

## 2023-04-06 PROCEDURE — 1160F RVW MEDS BY RX/DR IN RCRD: CPT | Performed by: NURSE PRACTITIONER

## 2023-04-06 PROCEDURE — 3075F SYST BP GE 130 - 139MM HG: CPT | Performed by: NURSE PRACTITIONER

## 2023-04-06 RX ORDER — LANCETS 28 GAUGE
EACH MISCELLANEOUS
COMMUNITY
Start: 2023-03-10

## 2023-04-06 RX ORDER — ORAL SEMAGLUTIDE 3 MG/1
3 TABLET ORAL DAILY
Qty: 90 TABLET | Refills: 0 | Status: SHIPPED | OUTPATIENT
Start: 2023-04-06

## 2023-04-06 NOTE — PROGRESS NOTES
"Chief Complaint  Hypertension (4 wk f/u ) and Diabetes    Subjective  One month follow up on hypertension and diabetes. Patient brought glucometer in today and memory show range from 140-300 with an average in low 200's. \"I noticed when I started the Jardiance that my blood sugars have been 200 or less.\" Patient also has log of BP readings from home with systolic readings ranging from 114-140. He is tolerating rybelsus and jardiance.       Jak Valle presents to [facility] [place]  History of Present Illness  Annual Physical Exam  Hypertension  This is a chronic problem. The current episode started more than 1 year ago. The problem has been rapidly worsening since onset. The problem is uncontrolled. Associated symptoms include blurred vision and malaise/fatigue. Pertinent negatives include no sweats. Risk factors for coronary artery disease include male gender, diabetes mellitus and dyslipidemia. Current antihypertension treatment includes angiotensin blockers. The current treatment provides mild improvement. Compliance problems include exercise and diet.  Hypertensive end-organ damage includes PVD. There is no history of CVA or retinopathy.   Diabetes  He presents for his follow-up diabetic visit. He has type 2 diabetes mellitus. No MedicAlert identification noted. The initial diagnosis of diabetes was made 11 Years ago. His disease course has been worsening. Hypoglycemia symptoms include speech difficulty. Pertinent negatives for hypoglycemia include no hunger, nervousness/anxiousness, pallor, seizures, sleepiness, sweats or tremors. Associated symptoms include blurred vision. Pertinent negatives for diabetes include no foot paresthesias, no foot ulcerations, no polydipsia, no polyphagia and no polyuria. Pertinent negatives for hypoglycemia complications include no blackouts, no hospitalization, no nocturnal hypoglycemia, no required assistance and no " required glucagon injection. Symptoms are improving. Diabetic complications include impotence and PVD. Pertinent negatives for diabetic complications include no CVA, heart disease, nephropathy, peripheral neuropathy or retinopathy. Risk factors for coronary artery disease include dyslipidemia, hypertension, obesity, stress and tobacco exposure. Current diabetic treatment includes oral agent (dual therapy). He is compliant with treatment most of the time. His weight is decreasing steadily. He is following a generally healthy, generally unhealthy and low salt diet. Meal planning includes avoidance of concentrated sweets. He has not had a previous visit with a dietitian. He participates in exercise intermittently. He monitors blood glucose at home 1-2 x per day. Blood glucose monitoring compliance is good. His home blood glucose trend is fluctuating dramatically. His breakfast blood glucose is taken between 8-9 am. His breakfast blood glucose range is generally 110-130 mg/dl. His dinner blood glucose is taken between 7-8 pm. His dinner blood glucose range is generally 180-200 mg/dl. His overall blood glucose range is 180-200 mg/dl. An ACE inhibitor/angiotensin II receptor blocker is being taken. He does not see a podiatrist.Eye exam is current.     Current Outpatient Medications on File Prior to Visit   Medication Sig Dispense Refill   • aspirin 81 MG chewable tablet Chew 1 tablet Daily.     • atorvastatin (Lipitor) 10 MG tablet Take 1 tablet by mouth Every Night. 30 tablet 2   • Blood Glucose Monitoring Suppl w/Device kit 1 each Daily. 1 each 2   • glucose blood test strip 1 each by Other route Daily. Use as instructed 100 each 2   • Lancets (freestyle) lancets      • losartan (COZAAR) 25 MG tablet Take 1 tablet by mouth Daily. 30 tablet 5   • [DISCONTINUED] empagliflozin (Jardiance) 10 MG tablet tablet Take 1 tablet by mouth Daily. 30 tablet 2   • [DISCONTINUED] Semaglutide (Rybelsus) 3 MG tablet Take 1 tablet by  "mouth Daily. Take at least 30 minutes prior to first meal of the day 30 tablet 2     No current facility-administered medications on file prior to visit.     Allergies   Allergen Reactions   • Januvia [Sitagliptin] Itching and Rash       Objective   Vital Signs:  /78   Pulse 95   Ht 182.9 cm (72\")   Wt 98.7 kg (217 lb 11.2 oz)   SpO2 98%   BMI 29.53 kg/m²   Estimated body mass index is 29.53 kg/m² as calculated from the following:    Height as of this encounter: 182.9 cm (72\").    Weight as of this encounter: 98.7 kg (217 lb 11.2 oz).       Physical Exam  Vitals and nursing note reviewed.   Constitutional:       Appearance: He is well-developed and overweight.   HENT:      Head: Normocephalic.   Eyes:      Pupils: Pupils are equal, round, and reactive to light.   Cardiovascular:      Rate and Rhythm: Normal rate and regular rhythm.      Heart sounds: Normal heart sounds.   Pulmonary:      Effort: Pulmonary effort is normal.      Breath sounds: Normal breath sounds.   Abdominal:      General: Bowel sounds are normal.      Palpations: Abdomen is soft.   Musculoskeletal:         General: Normal range of motion.   Skin:     General: Skin is warm.      Capillary Refill: Capillary refill takes less than 2 seconds.   Neurological:      Mental Status: He is alert and oriented to person, place, and time.   Psychiatric:         Behavior: Behavior normal.        Result Review :               Assessment and Plan   Diagnoses and all orders for this visit:    1. Primary hypertension (Primary)    2. Type 2 diabetes mellitus with hyperglycemia, without long-term current use of insulin  -     Hemoglobin A1c; Future  -     empagliflozin (Jardiance) 10 MG tablet tablet; Take 1 tablet by mouth Daily.  Dispense: 90 tablet; Refill: 0  -     Semaglutide (Rybelsus) 3 MG tablet; Take 1 tablet by mouth Daily. Take at least 30 minutes prior to first meal of the day  Dispense: 90 tablet; Refill: 0    3. Hypercholesterolemia  -     " Lipid Panel; Future    1. Primary hypertension  Continue losartan as previously prescribed.   Continue to monitor and log BPs at home.   Target SBP to be less than 140    2. Type 2 diabetes mellitus with hyperglycemia, without long-term current use of insulin   Continue Jardiance and Rybelsus as previously prescribed. Refill Rx sent to pharmacy.   Complete hemoglobin A1c couple of days prior to follow up and will discuss results at follow up.  Target hemoglobin A1c of below 10    3.Hypercholesterolemia  Continue lipitor as previously prescribed.   Complete lipid panel and will notify with results when available.   Encouraged diet low in saturated fats..       Follow Up   Return in about 2 months (around 6/6/2023) for Recheck.  Patient was given instructions and counseling regarding his condition or for health maintenance advice. Please see specific information pulled into the AVS if appropriate.         This document has been electronically signed by TWIN Benavidez on April 6, 2023 15:24 CDT

## 2023-04-24 DIAGNOSIS — H53.9 VISUAL CHANGES: Primary | ICD-10-CM

## 2023-05-02 DIAGNOSIS — Z12.5 SCREENING FOR MALIGNANT NEOPLASM OF PROSTATE: ICD-10-CM

## 2023-05-02 DIAGNOSIS — I10 ESSENTIAL HYPERTENSION: ICD-10-CM

## 2023-05-02 DIAGNOSIS — E11.65 TYPE 2 DIABETES MELLITUS WITH HYPERGLYCEMIA, WITHOUT LONG-TERM CURRENT USE OF INSULIN: ICD-10-CM

## 2023-05-02 RX ORDER — LOSARTAN POTASSIUM 25 MG/1
25 TABLET ORAL DAILY
Qty: 30 TABLET | Refills: 5 | Status: SHIPPED | OUTPATIENT
Start: 2023-05-02

## 2023-05-02 RX ORDER — ORAL SEMAGLUTIDE 3 MG/1
3 TABLET ORAL DAILY
Qty: 90 TABLET | Refills: 0 | Status: SHIPPED | OUTPATIENT
Start: 2023-05-02

## 2023-05-02 RX ORDER — ATORVASTATIN CALCIUM 10 MG/1
10 TABLET, FILM COATED ORAL NIGHTLY
Qty: 30 TABLET | Refills: 2 | Status: SHIPPED | OUTPATIENT
Start: 2023-05-02